# Patient Record
Sex: FEMALE | Race: WHITE | NOT HISPANIC OR LATINO | Employment: FULL TIME | URBAN - METROPOLITAN AREA
[De-identification: names, ages, dates, MRNs, and addresses within clinical notes are randomized per-mention and may not be internally consistent; named-entity substitution may affect disease eponyms.]

---

## 2017-01-13 ENCOUNTER — OFFICE VISIT - HEALTHEAST (OUTPATIENT)
Dept: FAMILY MEDICINE | Facility: CLINIC | Age: 27
End: 2017-01-13

## 2017-01-13 DIAGNOSIS — R10.2 PELVIC PAIN: ICD-10-CM

## 2017-01-13 DIAGNOSIS — N91.2 AMENORRHEA: ICD-10-CM

## 2017-01-13 ASSESSMENT — MIFFLIN-ST. JEOR: SCORE: 1332.35

## 2018-02-07 ENCOUNTER — OFFICE VISIT - HEALTHEAST (OUTPATIENT)
Dept: FAMILY MEDICINE | Facility: CLINIC | Age: 28
End: 2018-02-07

## 2018-02-07 DIAGNOSIS — N91.2 AMENORRHEA: ICD-10-CM

## 2018-02-07 DIAGNOSIS — R11.10 VOMITING: ICD-10-CM

## 2018-02-07 DIAGNOSIS — N92.1 METRORRHAGIA: ICD-10-CM

## 2018-02-07 DIAGNOSIS — R10.30 LOWER ABDOMINAL PAIN: ICD-10-CM

## 2018-02-07 LAB
ALBUMIN UR-MCNC: NEGATIVE MG/DL
APPEARANCE UR: CLEAR
BILIRUB UR QL STRIP: NEGATIVE
COLOR UR AUTO: YELLOW
GLUCOSE UR STRIP-MCNC: NEGATIVE MG/DL
HCG UR QL: POSITIVE
HGB UR QL STRIP: ABNORMAL
KETONES UR STRIP-MCNC: NEGATIVE MG/DL
LEUKOCYTE ESTERASE UR QL STRIP: ABNORMAL
NITRATE UR QL: NEGATIVE
PH UR STRIP: 6.5 [PH] (ref 5–8)
SP GR UR STRIP: 1.02 (ref 1–1.03)
UROBILINOGEN UR STRIP-ACNC: ABNORMAL

## 2018-02-08 LAB — BACTERIA SPEC CULT: NO GROWTH

## 2018-02-23 ENCOUNTER — OFFICE VISIT - HEALTHEAST (OUTPATIENT)
Dept: FAMILY MEDICINE | Facility: CLINIC | Age: 28
End: 2018-02-23

## 2018-02-23 DIAGNOSIS — Z34.81 ENCOUNTER FOR SUPERVISION OF OTHER NORMAL PREGNANCY IN FIRST TRIMESTER: ICD-10-CM

## 2018-02-23 LAB
ALBUMIN UR-MCNC: NEGATIVE MG/DL
APPEARANCE UR: CLEAR
BASOPHILS # BLD AUTO: 0 THOU/UL (ref 0–0.2)
BASOPHILS NFR BLD AUTO: 0 % (ref 0–2)
BILIRUB UR QL STRIP: NEGATIVE
COLOR UR AUTO: YELLOW
EOSINOPHIL # BLD AUTO: 0 THOU/UL (ref 0–0.4)
EOSINOPHIL NFR BLD AUTO: 0 % (ref 0–6)
ERYTHROCYTE [DISTWIDTH] IN BLOOD BY AUTOMATED COUNT: 12.6 % (ref 11–14.5)
GLUCOSE UR STRIP-MCNC: NEGATIVE MG/DL
HCT VFR BLD AUTO: 40.3 % (ref 35–47)
HGB BLD-MCNC: 13.4 G/DL (ref 12–16)
HGB UR QL STRIP: NEGATIVE
HIV 1+2 AB+HIV1 P24 AG SERPL QL IA: NEGATIVE
KETONES UR STRIP-MCNC: NEGATIVE MG/DL
LEUKOCYTE ESTERASE UR QL STRIP: NEGATIVE
LYMPHOCYTES # BLD AUTO: 2.3 THOU/UL (ref 0.8–4.4)
LYMPHOCYTES NFR BLD AUTO: 25 % (ref 20–40)
MCH RBC QN AUTO: 29.8 PG (ref 27–34)
MCHC RBC AUTO-ENTMCNC: 33.3 G/DL (ref 32–36)
MCV RBC AUTO: 90 FL (ref 80–100)
MONOCYTES # BLD AUTO: 0.7 THOU/UL (ref 0–0.9)
MONOCYTES NFR BLD AUTO: 8 % (ref 2–10)
NEUTROPHILS # BLD AUTO: 6 THOU/UL (ref 2–7.7)
NEUTROPHILS NFR BLD AUTO: 67 % (ref 50–70)
NITRATE UR QL: NEGATIVE
PH UR STRIP: 7 [PH] (ref 5–8)
PLATELET # BLD AUTO: 212 THOU/UL (ref 140–440)
PMV BLD AUTO: 12.2 FL (ref 8.5–12.5)
RBC # BLD AUTO: 4.49 MILL/UL (ref 3.8–5.4)
SP GR UR STRIP: 1.01 (ref 1–1.03)
UROBILINOGEN UR STRIP-ACNC: NORMAL
WBC: 9 THOU/UL (ref 4–11)

## 2018-02-23 ASSESSMENT — MIFFLIN-ST. JEOR: SCORE: 1206.7

## 2018-02-24 LAB
ANTIBODY SCREEN: NEGATIVE
BACTERIA SPEC CULT: NO GROWTH
HBV SURFACE AG SERPL QL IA: NEGATIVE

## 2018-02-25 LAB
RUBV IGG SERPL QL IA: NORMAL
SYPHILIS RPR SCREEN - HISTORICAL: NORMAL

## 2018-02-26 LAB
ABO/RH(D): NORMAL
ABORH REPEAT: NORMAL

## 2018-03-05 ENCOUNTER — COMMUNICATION - HEALTHEAST (OUTPATIENT)
Dept: FAMILY MEDICINE | Facility: CLINIC | Age: 28
End: 2018-03-05

## 2018-03-06 ENCOUNTER — HOSPITAL ENCOUNTER (OUTPATIENT)
Dept: ULTRASOUND IMAGING | Facility: HOSPITAL | Age: 28
Discharge: HOME | End: 2018-03-06
Attending: FAMILY MEDICINE

## 2018-03-06 DIAGNOSIS — Z34.81 ENCOUNTER FOR SUPERVISION OF OTHER NORMAL PREGNANCY IN FIRST TRIMESTER: ICD-10-CM

## 2018-03-08 ENCOUNTER — COMMUNICATION - HEALTHEAST (OUTPATIENT)
Dept: TELEHEALTH | Facility: CLINIC | Age: 28
End: 2018-03-08

## 2018-03-08 ENCOUNTER — PRENATAL OFFICE VISIT - HEALTHEAST (OUTPATIENT)
Dept: FAMILY MEDICINE | Facility: CLINIC | Age: 28
End: 2018-03-08

## 2018-03-08 DIAGNOSIS — Z34.81 ENCOUNTER FOR SUPERVISION OF OTHER NORMAL PREGNANCY IN FIRST TRIMESTER: ICD-10-CM

## 2018-03-09 LAB
C TRACH DNA SPEC QL PROBE+SIG AMP: NEGATIVE
N GONORRHOEA DNA SPEC QL NAA+PROBE: NEGATIVE

## 2018-04-06 ENCOUNTER — COMMUNICATION - HEALTHEAST (OUTPATIENT)
Dept: TELEHEALTH | Facility: CLINIC | Age: 28
End: 2018-04-06

## 2018-04-06 ENCOUNTER — PRENATAL OFFICE VISIT - HEALTHEAST (OUTPATIENT)
Dept: FAMILY MEDICINE | Facility: CLINIC | Age: 28
End: 2018-04-06

## 2018-04-06 DIAGNOSIS — Z34.81 ENCOUNTER FOR SUPERVISION OF OTHER NORMAL PREGNANCY IN FIRST TRIMESTER: ICD-10-CM

## 2018-04-23 ENCOUNTER — COMMUNICATION - HEALTHEAST (OUTPATIENT)
Dept: FAMILY MEDICINE | Facility: CLINIC | Age: 28
End: 2018-04-23

## 2018-05-04 ENCOUNTER — PRENATAL OFFICE VISIT - HEALTHEAST (OUTPATIENT)
Dept: FAMILY MEDICINE | Facility: CLINIC | Age: 28
End: 2018-05-04

## 2018-05-04 DIAGNOSIS — Z34.82 ENCOUNTER FOR SUPERVISION OF OTHER NORMAL PREGNANCY IN SECOND TRIMESTER: ICD-10-CM

## 2018-05-11 ENCOUNTER — HOSPITAL ENCOUNTER (OUTPATIENT)
Dept: ULTRASOUND IMAGING | Facility: HOSPITAL | Age: 28
Discharge: HOME | End: 2018-05-11
Attending: FAMILY MEDICINE

## 2018-06-08 ENCOUNTER — PRENATAL OFFICE VISIT - HEALTHEAST (OUTPATIENT)
Dept: FAMILY MEDICINE | Facility: CLINIC | Age: 28
End: 2018-06-08

## 2018-06-08 DIAGNOSIS — Z34.82 ENCOUNTER FOR SUPERVISION OF OTHER NORMAL PREGNANCY IN SECOND TRIMESTER: ICD-10-CM

## 2018-06-28 ENCOUNTER — PRENATAL OFFICE VISIT - HEALTHEAST (OUTPATIENT)
Dept: FAMILY MEDICINE | Facility: CLINIC | Age: 28
End: 2018-06-28

## 2018-06-28 DIAGNOSIS — Z34.02 ENCOUNTER FOR SUPERVISION OF NORMAL FIRST PREGNANCY IN SECOND TRIMESTER: ICD-10-CM

## 2018-07-17 ENCOUNTER — COMMUNICATION - HEALTHEAST (OUTPATIENT)
Dept: FAMILY MEDICINE | Facility: CLINIC | Age: 28
End: 2018-07-17

## 2018-07-18 ENCOUNTER — COMMUNICATION - HEALTHEAST (OUTPATIENT)
Dept: FAMILY MEDICINE | Facility: CLINIC | Age: 28
End: 2018-07-18

## 2018-07-20 ENCOUNTER — PRENATAL OFFICE VISIT - HEALTHEAST (OUTPATIENT)
Dept: FAMILY MEDICINE | Facility: CLINIC | Age: 28
End: 2018-07-20

## 2018-07-20 DIAGNOSIS — Z34.82 ENCOUNTER FOR SUPERVISION OF OTHER NORMAL PREGNANCY IN SECOND TRIMESTER: ICD-10-CM

## 2018-07-20 LAB
ERYTHROCYTE [DISTWIDTH] IN BLOOD BY AUTOMATED COUNT: 12.1 % (ref 11–14.5)
FASTING STATUS PATIENT QL REPORTED: NO
GLUCOSE 1H P 50 G GLC PO SERPL-MCNC: 98 MG/DL (ref 70–139)
HCT VFR BLD AUTO: 35.2 % (ref 35–47)
HGB BLD-MCNC: 12 G/DL (ref 12–16)
MCH RBC QN AUTO: 31.5 PG (ref 27–34)
MCHC RBC AUTO-ENTMCNC: 34.2 G/DL (ref 32–36)
MCV RBC AUTO: 92 FL (ref 80–100)
PLATELET # BLD AUTO: 162 THOU/UL (ref 140–440)
PMV BLD AUTO: 8.8 FL (ref 7–10)
RBC # BLD AUTO: 3.81 MILL/UL (ref 3.8–5.4)
WBC: 8.4 THOU/UL (ref 4–11)

## 2018-07-21 LAB — T PALLIDUM AB SER QL: NEGATIVE

## 2018-07-23 LAB
BKR LAB AP ABNORMAL BLEEDING: NO
BKR LAB AP BIRTH CONTROL/HORMONES: NORMAL
BKR LAB AP CERVICAL APPEARANCE: NORMAL
BKR LAB AP GYN ADEQUACY: NORMAL
BKR LAB AP GYN INTERPRETATION: NORMAL
BKR LAB AP HPV REFLEX: NORMAL
BKR LAB AP LMP: NORMAL
BKR LAB AP PATIENT STATUS: NORMAL
BKR LAB AP PREVIOUS ABNORMAL: NORMAL
BKR LAB AP PREVIOUS NORMAL: 2011
HIGH RISK?: NO
PATH REPORT.COMMENTS IMP SPEC: NORMAL
RESULT FLAG (HE HISTORICAL CONVERSION): NORMAL

## 2018-08-02 ENCOUNTER — PRENATAL OFFICE VISIT - HEALTHEAST (OUTPATIENT)
Dept: FAMILY MEDICINE | Facility: CLINIC | Age: 28
End: 2018-08-02

## 2018-08-02 DIAGNOSIS — Z34.83 ENCOUNTER FOR SUPERVISION OF OTHER NORMAL PREGNANCY IN THIRD TRIMESTER: ICD-10-CM

## 2018-08-17 ENCOUNTER — PRENATAL OFFICE VISIT - HEALTHEAST (OUTPATIENT)
Dept: FAMILY MEDICINE | Facility: CLINIC | Age: 28
End: 2018-08-17

## 2018-08-17 DIAGNOSIS — Z34.83 ENCOUNTER FOR SUPERVISION OF OTHER NORMAL PREGNANCY IN THIRD TRIMESTER: ICD-10-CM

## 2018-08-23 ENCOUNTER — HOSPITAL ENCOUNTER (OUTPATIENT)
Dept: OBGYN | Facility: HOSPITAL | Age: 28
Discharge: HOME | End: 2018-08-23
Attending: FAMILY MEDICINE | Admitting: FAMILY MEDICINE

## 2018-08-24 ENCOUNTER — COMMUNICATION - HEALTHEAST (OUTPATIENT)
Dept: FAMILY MEDICINE | Facility: CLINIC | Age: 28
End: 2018-08-24

## 2018-08-31 ENCOUNTER — PRENATAL OFFICE VISIT - HEALTHEAST (OUTPATIENT)
Dept: FAMILY MEDICINE | Facility: CLINIC | Age: 28
End: 2018-08-31

## 2018-08-31 DIAGNOSIS — Z34.83 ENCOUNTER FOR SUPERVISION OF OTHER NORMAL PREGNANCY IN THIRD TRIMESTER: ICD-10-CM

## 2018-09-05 ENCOUNTER — COMMUNICATION - HEALTHEAST (OUTPATIENT)
Dept: FAMILY MEDICINE | Facility: CLINIC | Age: 28
End: 2018-09-05

## 2018-09-06 ENCOUNTER — HOSPITAL ENCOUNTER (OUTPATIENT)
Dept: ULTRASOUND IMAGING | Facility: HOSPITAL | Age: 28
Discharge: HOME | End: 2018-09-06
Attending: FAMILY MEDICINE

## 2018-09-13 ENCOUNTER — PRENATAL OFFICE VISIT - HEALTHEAST (OUTPATIENT)
Dept: FAMILY MEDICINE | Facility: CLINIC | Age: 28
End: 2018-09-13

## 2018-09-13 DIAGNOSIS — Z34.83 ENCOUNTER FOR SUPERVISION OF OTHER NORMAL PREGNANCY IN THIRD TRIMESTER: ICD-10-CM

## 2018-09-14 LAB
ALLERGIC TO PENICILLIN: NO
GP B STREP DNA SPEC QL NAA+PROBE: NEGATIVE

## 2018-09-21 ENCOUNTER — PRENATAL OFFICE VISIT - HEALTHEAST (OUTPATIENT)
Dept: FAMILY MEDICINE | Facility: CLINIC | Age: 28
End: 2018-09-21

## 2018-09-21 DIAGNOSIS — Z34.03 ENCOUNTER FOR SUPERVISION OF NORMAL FIRST PREGNANCY IN THIRD TRIMESTER: ICD-10-CM

## 2018-09-28 ENCOUNTER — PRENATAL OFFICE VISIT - HEALTHEAST (OUTPATIENT)
Dept: FAMILY MEDICINE | Facility: CLINIC | Age: 28
End: 2018-09-28

## 2018-09-28 DIAGNOSIS — Z34.83 ENCOUNTER FOR SUPERVISION OF OTHER NORMAL PREGNANCY IN THIRD TRIMESTER: ICD-10-CM

## 2018-10-02 ENCOUNTER — COMMUNICATION - HEALTHEAST (OUTPATIENT)
Dept: FAMILY MEDICINE | Facility: CLINIC | Age: 28
End: 2018-10-02

## 2018-10-03 ENCOUNTER — COMMUNICATION - HEALTHEAST (OUTPATIENT)
Dept: OBGYN | Facility: CLINIC | Age: 28
End: 2018-10-03

## 2018-10-03 ENCOUNTER — HOSPITAL ENCOUNTER (OUTPATIENT)
Dept: ULTRASOUND IMAGING | Facility: HOSPITAL | Age: 28
Discharge: HOME | End: 2018-10-03
Attending: FAMILY MEDICINE

## 2018-10-03 ENCOUNTER — OFFICE VISIT - HEALTHEAST (OUTPATIENT)
Dept: FAMILY MEDICINE | Facility: CLINIC | Age: 28
End: 2018-10-03

## 2018-10-03 DIAGNOSIS — R60.0 EDEMA LEG: ICD-10-CM

## 2018-10-03 LAB
ALBUMIN SERPL-MCNC: 2.7 G/DL (ref 3.5–5)
ALBUMIN UR-MCNC: NEGATIVE MG/DL
ALP SERPL-CCNC: 215 U/L (ref 45–120)
ALT SERPL W P-5'-P-CCNC: 10 U/L (ref 0–45)
ANION GAP SERPL CALCULATED.3IONS-SCNC: 10 MMOL/L (ref 5–18)
APPEARANCE UR: CLEAR
AST SERPL W P-5'-P-CCNC: 18 U/L (ref 0–40)
BILIRUB SERPL-MCNC: 0.2 MG/DL (ref 0–1)
BILIRUB UR QL STRIP: NEGATIVE
BUN SERPL-MCNC: 10 MG/DL (ref 8–22)
CALCIUM SERPL-MCNC: 8.7 MG/DL (ref 8.5–10.5)
CHLORIDE BLD-SCNC: 109 MMOL/L (ref 98–107)
CO2 SERPL-SCNC: 20 MMOL/L (ref 22–31)
COLOR UR AUTO: YELLOW
CREAT SERPL-MCNC: 0.64 MG/DL (ref 0.6–1.1)
ERYTHROCYTE [DISTWIDTH] IN BLOOD BY AUTOMATED COUNT: 12.5 % (ref 11–14.5)
GFR SERPL CREATININE-BSD FRML MDRD: >60 ML/MIN/1.73M2
GLUCOSE BLD-MCNC: 74 MG/DL (ref 70–125)
GLUCOSE UR STRIP-MCNC: NEGATIVE MG/DL
HCT VFR BLD AUTO: 37.7 % (ref 35–47)
HGB BLD-MCNC: 12.9 G/DL (ref 12–16)
HGB UR QL STRIP: NEGATIVE
KETONES UR STRIP-MCNC: NEGATIVE MG/DL
LEUKOCYTE ESTERASE UR QL STRIP: NEGATIVE
MCH RBC QN AUTO: 30.7 PG (ref 27–34)
MCHC RBC AUTO-ENTMCNC: 34.2 G/DL (ref 32–36)
MCV RBC AUTO: 90 FL (ref 80–100)
NITRATE UR QL: NEGATIVE
PH UR STRIP: 7.5 [PH] (ref 5–8)
PLATELET # BLD AUTO: 115 THOU/UL (ref 140–440)
PMV BLD AUTO: 10.2 FL (ref 7–10)
POTASSIUM BLD-SCNC: 4.3 MMOL/L (ref 3.5–5)
PROT SERPL-MCNC: 5.6 G/DL (ref 6–8)
RBC # BLD AUTO: 4.19 MILL/UL (ref 3.8–5.4)
SODIUM SERPL-SCNC: 139 MMOL/L (ref 136–145)
SP GR UR STRIP: 1.01 (ref 1–1.03)
UROBILINOGEN UR STRIP-ACNC: NORMAL
WBC: 7.6 THOU/UL (ref 4–11)

## 2018-10-03 ASSESSMENT — MIFFLIN-ST. JEOR: SCORE: 1364.09

## 2018-10-05 ENCOUNTER — PRENATAL OFFICE VISIT - HEALTHEAST (OUTPATIENT)
Dept: FAMILY MEDICINE | Facility: CLINIC | Age: 28
End: 2018-10-05

## 2018-10-05 DIAGNOSIS — O36.8130 DECREASED FETAL MOVEMENTS IN THIRD TRIMESTER, SINGLE OR UNSPECIFIED FETUS: ICD-10-CM

## 2018-10-05 DIAGNOSIS — Z34.03 ENCOUNTER FOR SUPERVISION OF NORMAL FIRST PREGNANCY IN THIRD TRIMESTER: ICD-10-CM

## 2018-10-06 ENCOUNTER — ANESTHESIA - HEALTHEAST (OUTPATIENT)
Dept: OBGYN | Facility: CLINIC | Age: 28
End: 2018-10-06

## 2018-10-07 ENCOUNTER — HOME CARE/HOSPICE - HEALTHEAST (OUTPATIENT)
Dept: HOME HEALTH SERVICES | Facility: HOME HEALTH | Age: 28
End: 2018-10-07

## 2018-10-09 ENCOUNTER — HOME CARE/HOSPICE - HEALTHEAST (OUTPATIENT)
Dept: HOME HEALTH SERVICES | Facility: HOME HEALTH | Age: 28
End: 2018-10-09

## 2018-10-09 ENCOUNTER — COMMUNICATION - HEALTHEAST (OUTPATIENT)
Dept: SCHEDULING | Facility: CLINIC | Age: 28
End: 2018-10-09

## 2018-10-16 ENCOUNTER — OFFICE VISIT - HEALTHEAST (OUTPATIENT)
Dept: FAMILY MEDICINE | Facility: CLINIC | Age: 28
End: 2018-10-16

## 2018-10-29 ENCOUNTER — COMMUNICATION - HEALTHEAST (OUTPATIENT)
Dept: FAMILY MEDICINE | Facility: CLINIC | Age: 28
End: 2018-10-29

## 2018-11-04 ENCOUNTER — COMMUNICATION - HEALTHEAST (OUTPATIENT)
Dept: OBGYN | Facility: CLINIC | Age: 28
End: 2018-11-04

## 2018-11-16 ENCOUNTER — OFFICE VISIT - HEALTHEAST (OUTPATIENT)
Dept: FAMILY MEDICINE | Facility: CLINIC | Age: 28
End: 2018-11-16

## 2018-11-16 ASSESSMENT — MIFFLIN-ST. JEOR: SCORE: 1267.93

## 2018-11-19 ENCOUNTER — OFFICE VISIT - HEALTHEAST (OUTPATIENT)
Dept: FAMILY MEDICINE | Facility: CLINIC | Age: 28
End: 2018-11-19

## 2018-11-19 DIAGNOSIS — Z30.430 ENCOUNTER FOR INSERTION OF INTRAUTERINE CONTRACEPTIVE DEVICE (IUD): ICD-10-CM

## 2018-12-03 ENCOUNTER — COMMUNICATION - HEALTHEAST (OUTPATIENT)
Dept: OBGYN | Facility: CLINIC | Age: 28
End: 2018-12-03

## 2019-01-01 ENCOUNTER — COMMUNICATION - HEALTHEAST (OUTPATIENT)
Dept: OBGYN | Facility: CLINIC | Age: 29
End: 2019-01-01

## 2019-01-24 ENCOUNTER — COMMUNICATION - HEALTHEAST (OUTPATIENT)
Dept: SCHEDULING | Facility: CLINIC | Age: 29
End: 2019-01-24

## 2019-01-28 ENCOUNTER — OFFICE VISIT - HEALTHEAST (OUTPATIENT)
Dept: FAMILY MEDICINE | Facility: CLINIC | Age: 29
End: 2019-01-28

## 2019-01-28 DIAGNOSIS — S39.012A BACK STRAIN, INITIAL ENCOUNTER: ICD-10-CM

## 2019-01-28 DIAGNOSIS — B08.1 MOLLUSCUM CONTAGIOSUM: ICD-10-CM

## 2019-01-28 ASSESSMENT — MIFFLIN-ST. JEOR: SCORE: 1213.5

## 2019-02-21 ENCOUNTER — COMMUNICATION - HEALTHEAST (OUTPATIENT)
Dept: FAMILY MEDICINE | Facility: CLINIC | Age: 29
End: 2019-02-21

## 2019-03-08 ENCOUNTER — COMMUNICATION - HEALTHEAST (OUTPATIENT)
Dept: FAMILY MEDICINE | Facility: CLINIC | Age: 29
End: 2019-03-08

## 2019-05-21 ENCOUNTER — OFFICE VISIT - HEALTHEAST (OUTPATIENT)
Dept: FAMILY MEDICINE | Facility: CLINIC | Age: 29
End: 2019-05-21

## 2019-05-21 DIAGNOSIS — R05.9 COUGH: ICD-10-CM

## 2019-05-21 DIAGNOSIS — R53.83 FATIGUE, UNSPECIFIED TYPE: ICD-10-CM

## 2019-06-14 ENCOUNTER — OFFICE VISIT - HEALTHEAST (OUTPATIENT)
Dept: FAMILY MEDICINE | Facility: CLINIC | Age: 29
End: 2019-06-14

## 2019-06-14 DIAGNOSIS — F32.0 CURRENT MILD EPISODE OF MAJOR DEPRESSIVE DISORDER WITHOUT PRIOR EPISODE (H): ICD-10-CM

## 2019-06-14 DIAGNOSIS — R06.81 WITNESSED APNEIC SPELLS: ICD-10-CM

## 2019-06-14 DIAGNOSIS — R06.83 SNORING: ICD-10-CM

## 2019-06-14 ASSESSMENT — MIFFLIN-ST. JEOR: SCORE: 1177.21

## 2019-07-12 ENCOUNTER — OFFICE VISIT - HEALTHEAST (OUTPATIENT)
Dept: FAMILY MEDICINE | Facility: CLINIC | Age: 29
End: 2019-07-12

## 2019-07-12 ENCOUNTER — RECORDS - HEALTHEAST (OUTPATIENT)
Dept: ADMINISTRATIVE | Facility: OTHER | Age: 29
End: 2019-07-12

## 2019-07-12 DIAGNOSIS — Z30.017 ENCOUNTER FOR INITIAL PRESCRIPTION OF IMPLANTABLE SUBDERMAL CONTRACEPTIVE: ICD-10-CM

## 2019-07-12 DIAGNOSIS — T83.32XA INTRAUTERINE CONTRACEPTIVE DEVICE THREADS LOST, INITIAL ENCOUNTER: ICD-10-CM

## 2019-07-12 LAB — HCG UR QL: NEGATIVE

## 2019-07-12 ASSESSMENT — MIFFLIN-ST. JEOR: SCORE: 1159.06

## 2019-07-16 ENCOUNTER — OFFICE VISIT - HEALTHEAST (OUTPATIENT)
Dept: OBGYN | Facility: CLINIC | Age: 29
End: 2019-07-16

## 2019-07-16 DIAGNOSIS — Z30.432 ENCOUNTER FOR IUD REMOVAL: ICD-10-CM

## 2019-07-16 ASSESSMENT — MIFFLIN-ST. JEOR: SCORE: 1172.67

## 2019-07-22 ENCOUNTER — COMMUNICATION - HEALTHEAST (OUTPATIENT)
Dept: FAMILY MEDICINE | Facility: CLINIC | Age: 29
End: 2019-07-22

## 2019-07-22 ENCOUNTER — HOSPITAL ENCOUNTER (OUTPATIENT)
Dept: ULTRASOUND IMAGING | Facility: HOSPITAL | Age: 29
Discharge: HOME | End: 2019-07-22
Attending: FAMILY MEDICINE

## 2019-07-22 ENCOUNTER — OFFICE VISIT - HEALTHEAST (OUTPATIENT)
Dept: FAMILY MEDICINE | Facility: CLINIC | Age: 29
End: 2019-07-22

## 2019-07-22 DIAGNOSIS — M25.551 PELVIC JOINT PAIN, RIGHT: ICD-10-CM

## 2019-07-22 DIAGNOSIS — R10.31 RLQ ABDOMINAL PAIN: ICD-10-CM

## 2019-07-22 DIAGNOSIS — B37.31 YEAST INFECTION OF THE VAGINA: ICD-10-CM

## 2019-07-22 LAB
CLUE CELLS: ABNORMAL
TRICHOMONAS, WET PREP: ABNORMAL
YEAST, WET PREP: ABNORMAL

## 2019-09-17 ENCOUNTER — COMMUNICATION - HEALTHEAST (OUTPATIENT)
Dept: FAMILY MEDICINE | Facility: CLINIC | Age: 29
End: 2019-09-17

## 2019-09-18 ENCOUNTER — COMMUNICATION - HEALTHEAST (OUTPATIENT)
Dept: FAMILY MEDICINE | Facility: CLINIC | Age: 29
End: 2019-09-18

## 2019-12-06 ENCOUNTER — OFFICE VISIT - HEALTHEAST (OUTPATIENT)
Dept: FAMILY MEDICINE | Facility: CLINIC | Age: 29
End: 2019-12-06

## 2019-12-06 DIAGNOSIS — Z30.46 ENCOUNTER FOR SURVEILLANCE OF IMPLANTABLE SUBDERMAL CONTRACEPTIVE: ICD-10-CM

## 2019-12-06 DIAGNOSIS — Z30.015 ENCOUNTER FOR INITIAL PRESCRIPTION OF VAGINAL RING HORMONAL CONTRACEPTIVE: ICD-10-CM

## 2019-12-06 ASSESSMENT — MIFFLIN-ST. JEOR: SCORE: 1195.35

## 2020-02-07 ENCOUNTER — COMMUNICATION - HEALTHEAST (OUTPATIENT)
Dept: FAMILY MEDICINE | Facility: CLINIC | Age: 30
End: 2020-02-07

## 2020-02-13 ENCOUNTER — COMMUNICATION - HEALTHEAST (OUTPATIENT)
Dept: FAMILY MEDICINE | Facility: CLINIC | Age: 30
End: 2020-02-13

## 2020-02-14 ENCOUNTER — OFFICE VISIT - HEALTHEAST (OUTPATIENT)
Dept: FAMILY MEDICINE | Facility: CLINIC | Age: 30
End: 2020-02-14

## 2020-02-14 DIAGNOSIS — Z30.430 ENCOUNTER FOR IUD INSERTION: ICD-10-CM

## 2020-02-14 DIAGNOSIS — Z01.818 PRE-PROCEDURAL EXAMINATION: ICD-10-CM

## 2020-02-14 LAB — HCG UR QL: NEGATIVE

## 2020-02-14 ASSESSMENT — PATIENT HEALTH QUESTIONNAIRE - PHQ9: SUM OF ALL RESPONSES TO PHQ QUESTIONS 1-9: 4

## 2020-02-17 LAB
C TRACH DNA SPEC QL PROBE+SIG AMP: NEGATIVE
N GONORRHOEA DNA SPEC QL NAA+PROBE: NEGATIVE

## 2020-04-08 ENCOUNTER — COMMUNICATION - HEALTHEAST (OUTPATIENT)
Dept: SCHEDULING | Facility: CLINIC | Age: 30
End: 2020-04-08

## 2020-10-08 ENCOUNTER — COMMUNICATION - HEALTHEAST (OUTPATIENT)
Dept: SCHEDULING | Facility: CLINIC | Age: 30
End: 2020-10-08

## 2020-10-08 DIAGNOSIS — S05.00XD CORNEAL ABRASION, UNSPECIFIED LATERALITY, SUBSEQUENT ENCOUNTER: ICD-10-CM

## 2020-10-09 ENCOUNTER — COMMUNICATION - HEALTHEAST (OUTPATIENT)
Dept: SCHEDULING | Facility: CLINIC | Age: 30
End: 2020-10-09

## 2020-10-09 DIAGNOSIS — S05.00XD CORNEAL ABRASION, UNSPECIFIED LATERALITY, SUBSEQUENT ENCOUNTER: ICD-10-CM

## 2020-10-09 RX ORDER — CODEINE SULFATE 30 MG/1
30 TABLET ORAL EVERY 6 HOURS PRN
Qty: 10 TABLET | Refills: 0 | Status: SHIPPED | OUTPATIENT
Start: 2020-10-09 | End: 2022-03-04

## 2021-05-27 ASSESSMENT — PATIENT HEALTH QUESTIONNAIRE - PHQ9: SUM OF ALL RESPONSES TO PHQ QUESTIONS 1-9: 4

## 2021-05-29 NOTE — PROGRESS NOTES
Assessment/Plan:    1. Cough  Cough noted.  No evidence for recurrent bacterial sinusitis, pneumonia etc.  Guaifenesin with codeine prescribed as directed for cough suppression.  Notify persistent concerns or if worsening.  - codeine-guaiFENesin (GUAIFENESIN AC)  mg/5 mL liquid; Take 5-10 mL by mouth 3 (three) times a day as needed for cough.  Dispense: 240 mL; Refill: 0    2.  Fatigue  Likely multifactorial.  8-month-old daughter at home.  Patient declines labs including TSH to rule out postpartum thyroiditis, anemia etc.  Patient elects to monitor with changing season and hopefully more sun exposure in case of vitamin D issues.          Subjective:    Charlotte Clarke is seen today for concerns with cough.  Nonproductive.  Ongoing since last Friday.  Nasal congestion.  Postnasal drainage, scant.  No nausea vomiting.  No fevers or chills.  Also fatigue issues ongoing over many months.  Gets 6 to 8 hours of sleep.  Daughter was born in October currently 8 months of age.  Moved into a new home recently.  Doing the yard work etc.  Busy in general.  No history of significant snoring or history of sleep apnea.  Denies history of significant anemia etc.  No history of vitamin D deficiency described.  Comprehensive review of systems as above otherwise all negative.    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 No pertinent surgical history.     No family history on file.     History reviewed. No pertinent past medical history.     Social History     Tobacco Use     Smoking status: Former Smoker     Last attempt to quit: 2018     Years since quittin.2     Smokeless tobacco: Never Used   Substance Use Topics     Alcohol use: No     Drug use: No         Current Outpatient Medications   Medication Sig Dispense Refill     acetaminophen (TYLENOL) 325 MG tablet Take 1-2 tablets (325-650 mg total) by mouth every 4 (four) hours as needed.  0     cyclobenzaprine (FLEXERIL) 10 MG tablet Take 0.5 tablets (5 mg total) by mouth every 8 (eight) hours as needed for muscle spasms. 30 tablet 1     hydrOXYzine HCl (ATARAX) 25 MG tablet Take 1 tablet (25 mg total) by mouth 3 (three) times a day as needed for anxiety. 90 tablet 0     ibuprofen (ADVIL,MOTRIN) 800 MG tablet TAKE 1 TABLET BY MOUTH EVERY 8 HOURS 90 tablet 0     PRENATAL VIT37/IRON/FOLIC ACID (PRENATA ORAL) Take by mouth.       codeine-guaiFENesin (GUAIFENESIN AC)  mg/5 mL liquid Take 5-10 mL by mouth 3 (three) times a day as needed for cough. 240 mL 0     No current facility-administered medications for this visit.           Objective:    Vitals:    05/21/19 1413   BP: 100/60   Pulse: 80   Temp: 97.9  F (36.6  C)   SpO2: 99%   Weight: 116 lb (52.6 kg)      Body mass index is 22.65 kg/m .    Alert.  No apparent distress.  Nontoxic.  HEENT exam appears relatively normal with mild nasal congestion only.  Oropharynx with moist mucous membranes without postnasal drainage.  Neck supple.  Chest clear currently without expiratory wheeze or inspiratory crackles.  Cardiac exam regular.  Extremities warm and dry with good skin turgor.  Brisk capillary refill.      This note has been dictated using voice recognition software and as a result may contain minor grammatical errors and unintended word substitutions.

## 2021-05-29 NOTE — PROGRESS NOTES
Assessment/Plan:     Patient presents to clinic for irritability and depression symptoms.  She and I reviewed the diagnostic criteria for bipolar disorder, as her  made a comment about her emotional lability.  I do not feel, at this time, that patient qualifies for a clinical diagnosis of bipolar type I or type II.  We will treat her symptoms as a depression with irritability manifestations.  In addition, her witnessed sleep apnea and snoring bear further scrutiny.    We chose to do the following after extensive shared decision making:  -Initiation on Prozac, low-dose  -Initiation of hydroxyzine 12.5mg to 25 mg up to 3 times daily as needed or to help with sleep  -Melatonin for middle of the night awakening  -Consider individual or couples counseling  -Referral to sleep medicine    Problem List Items Addressed This Visit     None      Visit Diagnoses     Snoring    -  Primary    Relevant Orders    Ambulatory referral to Sleep Medicine    Witnessed apneic spells        Relevant Orders    Ambulatory referral to Sleep Medicine    Current mild episode of major depressive disorder without prior episode (H)        Relevant Medications    FLUoxetine (PROZAC) 10 MG capsule          Return to clinic in 6 weeks.    Total time spent with patient was 20 minutes with greater than 50% spent in face-to-face counseling regarding the above plan.    Subjective:      Charlotte Clarke is a 29 y.o. female who presents to clinic for depression follow-up.    Patient states that she feels angry all of the time.  She will awaken at 3 AM every night gasping for air and will just have her mind racing with thoughts of frustration with her partner.  She feels as though her thoughts are racing sometimes.  She also endorses significant irritability with anything her  does.  Her partner feels as though she is depressed or bipolar.  She feels that she has anxiety.  Patient is endorsing poor sleep, secondary to this 3 AM awakening.   She also endorses snoring.    Patient has never been to counseling before but thinks that couples counseling may very well benefit her relationship.  Her partner is not interested at this time.    She has no thought of hurting herself or others, but expanses significant irritability and other depression symptoms.    Phq9: 8  Gad7: 12    Past Medical History, Family History, and Social History reviewed.     Current Outpatient Medications on File Prior to Visit   Medication Sig Dispense Refill     [DISCONTINUED] acetaminophen (TYLENOL) 325 MG tablet Take 1-2 tablets (325-650 mg total) by mouth every 4 (four) hours as needed.  0     [DISCONTINUED] ibuprofen (ADVIL,MOTRIN) 800 MG tablet TAKE 1 TABLET BY MOUTH EVERY 8 HOURS 90 tablet 0     hydrOXYzine HCl (ATARAX) 25 MG tablet Take 1 tablet (25 mg total) by mouth 3 (three) times a day as needed for anxiety. 90 tablet 0     [DISCONTINUED] cyclobenzaprine (FLEXERIL) 10 MG tablet Take 0.5 tablets (5 mg total) by mouth every 8 (eight) hours as needed for muscle spasms. 30 tablet 1     [DISCONTINUED] PRENATAL VIT37/IRON/FOLIC ACID (PRENATA ORAL) Take by mouth.       No current facility-administered medications on file prior to visit.        Review of systems is as stated in HPI.  The remainder of the 10 system review is otherwise negative.    Objective:     /70   Pulse 85   Ht 5' (1.524 m)   Wt 117 lb (53.1 kg)   SpO2 99%   BMI 22.85 kg/m   Body mass index is 22.85 kg/m .    Gen: Alert, NAD, appears stated age, normal hygiene   Psych: no apparent hallucinations or delusions, no pressured speech; alert, oriented x3    This note has been dictated using voice recognition software. Any grammatical or context distortions are unintentional and inherent to the the software.     Maria Antonia Duke MD

## 2021-05-30 ENCOUNTER — RECORDS - HEALTHEAST (OUTPATIENT)
Dept: ADMINISTRATIVE | Facility: CLINIC | Age: 31
End: 2021-05-30

## 2021-05-30 VITALS — BODY MASS INDEX: 27.68 KG/M2 | WEIGHT: 146.6 LBS | HEIGHT: 61 IN

## 2021-05-30 NOTE — PROGRESS NOTES
Assessment/Plan:     Patient presents to clinic with ongoing right lower quadrant versus pelvic pain exclusively present with intercourse which remained s/p removal of the IUD.  She denies vaginal discharge, but a wet prep was performed today for completeness, did demonstrate yeast, treated with diflucan - unlikely to be the source of her focal pain.  She denies concerns about gonorrhea/chlamydia.  Transvaginal ultrasound ordered for later today, we will await results to determine next steps.     Problem List Items Addressed This Visit     None      Visit Diagnoses     RLQ abdominal pain    -  Primary    Relevant Orders    US Pelvis With Transvaginal Non OB    Wet Prep, Vaginal (Completed)    Pelvic joint pain, right        Relevant Orders    US Pelvis With Transvaginal Non OB    Wet Prep, Vaginal (Completed)    Yeast infection of the vagina        Relevant Medications    fluconazole (DIFLUCAN) 150 MG tablet          Return to clinic in 1 week after ultrasound.     Total time spent with patient was 15 minutes with greater than 50% spent in face-to-face counseling regarding the above plan.    Subjective:      Charlotte Clarke is a 29 y.o. female who presents to clinic for follow-up from ongoing pelvic pressure.    Patient first began noticing pelvic pressure with intercourse on the right side in the lower abdomen/pelvis approximately 5 months ago when patient's IUD was placed.  The pressure is only present during intercourse.  She believed it was attributable to the IUD, this was removed recently but the first instance of intercourse after removal resulted in the same discomfort.  She denies vaginal discharge.  She denies discomfort with any other activity aside from intercourse.      Past Medical History, Family History, and Social History reviewed.     No current outpatient medications on file prior to visit.     No current facility-administered medications on file prior to visit.        Review of systems is as  stated in HPI.  The remainder of the 10 system review is otherwise negative.    Objective:     /72   Pulse 75   SpO2 99%  There is no height or weight on file to calculate BMI.    Gen: Alert, NAD, appears stated age, normal hygiene   : Absence of pain with insertion of speculum, cervical motion tenderness absent, mild tenderness to palpation when bimanual exam performed on the right, absent discomfort on the left, no significant vaginal discharge, normal appearance of cervix    This note has been dictated using voice recognition software. Any grammatical or context distortions are unintentional and inherent to the the software.     Maria Antonia Duke MD

## 2021-05-30 NOTE — TELEPHONE ENCOUNTER
Called patient to discuss the findings on the ultrasound after discussing this with the radiologist. His differential is hemorrhagic ovarian cyst vs endometrioma. Left message with patient.

## 2021-05-30 NOTE — PROGRESS NOTES
CC: The patient is being seen as a consult from Dr Duke secondary to a retained IUD.    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  Attempt was made to remove it by Dr Duke on 19.  She had the strings trimmed as her partner could feel them.  She had a Nexplanon placed on 19.  The Mirena has been in place for 4-5 months; she wants it out as she's having significant cramping since having it placed.    Past Medical History:   Diagnosis Date     Depression        No past surgical history on file.    Patient's No family history on file.    Patient   Social History     Socioeconomic History     Marital status: Single     Spouse name: None     Number of children: None     Years of education: None     Highest education level: None   Occupational History     None   Social Needs     Financial resource strain: None     Food insecurity:     Worry: None     Inability: None     Transportation needs:     Medical: None     Non-medical: None   Tobacco Use     Smoking status: Former Smoker     Last attempt to quit: 2018     Years since quittin.4     Smokeless tobacco: Never Used   Substance and Sexual Activity     Alcohol use: No     Drug use: No     Sexual activity: Yes   Lifestyle     Physical activity:     Days per week: None     Minutes per session: None     Stress: None   Relationships     Social connections:     Talks on phone: None     Gets together: None     Attends [employer] service: None     Active member of club or organization: None     Attends meetings of clubs or organizations: None     Relationship status: None     Intimate partner violence:     Fear of current or ex partner: None     Emotionally abused: None     Physically abused: None     Forced sexual activity: None   Other Topics Concern     None   Social History Narrative     None       No current outpatient medications on file.     No current facility-administered medications for this visit.         Patient has No Known Allergies.    ROS:  12 part ROS is negative aside from those symptoms in the HPI    PE:  /60   Pulse 64   Ht 5' (1.524 m)   Wt 116 lb (52.6 kg)           Body mass index is 22.65 kg/m .    General: WN/WD WF, NAD  Pelvic: EG/BUS no lesions, no skin change   Vagina no lesions, no discharge   Cervix no lesions, no cervical motion tenderness, no strings present; long Charlotte clamp was used to explore the endocervical canal.  After several attempts the strings were grasped and the IUD was removed without difficulty.  The patient tolerated the procedure well.   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    Plan: Removal discussed with the patient.  Possible changes in the menstrual periods discussed with her as well.  We discussed that her cramps will likely improve now as well.

## 2021-05-30 NOTE — TELEPHONE ENCOUNTER
Called patient to discuss the options with respect to her ovarian mass: cyst vs endometrioma.     Patient and I chose to watchfully wait.  We would consider getting an MRI once enough time is passed to make additional imaging worthwhile per radiologist recommendation.  Low threshold to refer to OB/GYN.

## 2021-05-30 NOTE — PROGRESS NOTES
Procedure: Nexplanon Insertion and attempted IUD removal  Indication: Desire for contraception but does not like the Mirena  Contraindications: None    Subjective: Patient desires contraception. She has been informed of all of her available options with respect to contraception. She is confident in her decision. She was having pain with her IUD.    Objective.  Vitals:    07/12/19 1252   BP: 94/70   Pulse: 89   SpO2: 99%     Gen: NAD, slightly nervous  Psych: no pressured speech, no evidence of delusions    Procedure:  After adequate informed consent was provided and the consent form was signed, patient while in the supine position, the left arm was examined.   The left arm was positioned and at 10 cm proximal to the lateral epicondyle on inner arm but not between the muscles approx. 5 cc of 1% lidocaine with epinephrine was infiltrated into the anticipated insertion site after cleaning with alcohol pad. After adequate anesthesia was noted the area was prepped with Betadine. The Nexplanon was inserted without difficulty with the applicator, and was easily palpable in upper arm.   Sterile bandaid was applied, followed by a 4 x 4 gauze folded and held in place by Kerlix pressure bandage. No complications. Return precautions discussed.  Patient understands that removal should be done no later than 3 years.    However, we then proceeded to the IUD removal. Despite repeated attempts at visualization and use of the cytobrush, could not appreciate the IUD strings.    Contacted OB/GYN who was comfortable with the duplicate progesterone therapy for a short period of time and referred patient to GYN for IUD removal. Patient's UPT was negative.     Maria Antonia Duke MD  Family Medicine [facility]

## 2021-05-31 VITALS — BODY MASS INDEX: 22.48 KG/M2 | WEIGHT: 119 LBS

## 2021-05-31 VITALS — WEIGHT: 120 LBS | BODY MASS INDEX: 22.66 KG/M2 | HEIGHT: 61 IN

## 2021-06-01 ENCOUNTER — RECORDS - HEALTHEAST (OUTPATIENT)
Dept: ADMINISTRATIVE | Facility: CLINIC | Age: 31
End: 2021-06-01

## 2021-06-01 VITALS — HEIGHT: 61 IN | WEIGHT: 154.7 LBS | BODY MASS INDEX: 29.21 KG/M2

## 2021-06-01 VITALS — WEIGHT: 127 LBS | BODY MASS INDEX: 24 KG/M2

## 2021-06-01 VITALS — BODY MASS INDEX: 25.51 KG/M2 | WEIGHT: 135 LBS

## 2021-06-01 VITALS — BODY MASS INDEX: 25.97 KG/M2 | WEIGHT: 133 LBS

## 2021-06-01 VITALS — BODY MASS INDEX: 23.62 KG/M2 | WEIGHT: 125 LBS

## 2021-06-01 VITALS — WEIGHT: 133 LBS | BODY MASS INDEX: 25.13 KG/M2

## 2021-06-01 VITALS — WEIGHT: 150 LBS | BODY MASS INDEX: 28.34 KG/M2

## 2021-06-01 VITALS — WEIGHT: 138 LBS | BODY MASS INDEX: 26.07 KG/M2

## 2021-06-01 VITALS — WEIGHT: 120 LBS | BODY MASS INDEX: 22.67 KG/M2

## 2021-06-01 VITALS — BODY MASS INDEX: 29.29 KG/M2 | WEIGHT: 155 LBS

## 2021-06-01 VITALS — BODY MASS INDEX: 23.05 KG/M2 | WEIGHT: 122 LBS

## 2021-06-01 VITALS — WEIGHT: 134 LBS | BODY MASS INDEX: 25.32 KG/M2

## 2021-06-01 VITALS — WEIGHT: 147 LBS | BODY MASS INDEX: 27.78 KG/M2

## 2021-06-01 NOTE — TELEPHONE ENCOUNTER
Who is calling:  Patient called back.  Reason for Call:  States that Dr Robinson called and left a message to call her back.  No encounter noted in patients chart regarding a call.  Please call her back.  Date of last appointment with primary care: 7/22/2019  Okay to leave a detailed message: Yes

## 2021-06-02 VITALS — HEIGHT: 60 IN | WEIGHT: 137 LBS | BODY MASS INDEX: 26.9 KG/M2

## 2021-06-02 VITALS — BODY MASS INDEX: 24.54 KG/M2 | HEIGHT: 60 IN | WEIGHT: 125 LBS

## 2021-06-02 VITALS — WEIGHT: 116 LBS | BODY MASS INDEX: 22.65 KG/M2

## 2021-06-03 VITALS — BODY MASS INDEX: 22.78 KG/M2 | HEIGHT: 60 IN | WEIGHT: 116 LBS

## 2021-06-03 VITALS
BODY MASS INDEX: 23.75 KG/M2 | DIASTOLIC BLOOD PRESSURE: 70 MMHG | HEIGHT: 60 IN | SYSTOLIC BLOOD PRESSURE: 108 MMHG | HEART RATE: 92 BPM | OXYGEN SATURATION: 99 % | WEIGHT: 121 LBS

## 2021-06-03 VITALS — WEIGHT: 113 LBS | HEIGHT: 60 IN | BODY MASS INDEX: 22.19 KG/M2

## 2021-06-03 VITALS — HEIGHT: 60 IN | WEIGHT: 117 LBS | BODY MASS INDEX: 22.97 KG/M2

## 2021-06-04 VITALS
DIASTOLIC BLOOD PRESSURE: 54 MMHG | SYSTOLIC BLOOD PRESSURE: 118 MMHG | BODY MASS INDEX: 23.53 KG/M2 | OXYGEN SATURATION: 99 % | WEIGHT: 120.5 LBS | HEART RATE: 101 BPM

## 2021-06-04 NOTE — PROGRESS NOTES
Procedure: Nexplanon Removal  Indication: Desire for contraception but does not prefer the nexplanon at this time  Contraindications: None    Subjective: Patient desires contraception. She has been informed of all of her available options with respect to contraception. She is confident in her decision to have the nexplanon removed. She is interested in either the IUD, the oral pill or the nuvaring.    Objective.  Vitals:    12/06/19 1431   BP: 108/70   Pulse: 92   SpO2: 99%     Gen: NAD, slightly nervous  Psych: no pressured speech, no evidence of delusions    Procedure:  After adequate informed consent was provided and the consent form was signed, patient while in the supine position, the left arm was examined. The nexplanon was carefully palpated on the medial portion of the arm. Localization of the removal site was noted.  The area was prepped with an alcohol pad. Next, approx. 5 cc of 1% lidocaine with epinephrine was infiltrated into the anticipated removal site. After adequate anesthesia was noted, betadine was applied and a number 11 blade was used to break the skin and a 3-4 mm incision was made transversely over palpated implant.  At this point the curved Charlotte clamp was placed just beneath the skin and guided to Nexplanon.The product was secured and drawn towards the incision site. With gentle traction the product was removed without difficulty. At this time assessment of the operative site noted no significant bleeding.   Incision was closed with a bandaid followed by a 4 x 4 gauze folded and held in place by Kerlix pressure bandage.    Assessment/Plan: Patient and I had an extensive discussion about all of her contraceptive options.  Originally she was more interested in the Mirena IUD and the oral combination pill versus the NuvaRing.  However, it came apparent the patient believes the NuvaRing was the diaphragm, and with further education became convinced that the NuvaRing may be a viable option for  her.  We will trial this for 1 to several months and could always switch to either the Mirena or the oral combination contraceptive in the future.  NuvaRing prescribed after Nexplanon removal per patient preference.    Maria Antonia Duke MD  Family Medicine [facility]

## 2021-06-05 NOTE — TELEPHONE ENCOUNTER
Left message to call back for: IUD placement  Information to relay to patient:  Please notify patient she does not need a consultation with Dr. Kaur for an IUD placement and she can check thyroid labs at the time of her mirena IUD placement.     Please help reschedule patients appointment for 40 minutes with Dr. Kaur. Ideally she would like to place the IUD the week after her menstrual period.

## 2021-06-05 NOTE — TELEPHONE ENCOUNTER
Who is calling:  The patient.  Reason for Call:  The patient has an upcoming appointment and would like to know if she needs to have a consult with her primary first?  The patient states that she has it before.  Date of last appointment with primary care: 12-06-19  Okay to leave a detailed message: Yes

## 2021-06-05 NOTE — TELEPHONE ENCOUNTER
"Before I call her are you able to do her \"thyroid check\" at the same time as mirena IUD placement?  Also do you want her Mirena to be placed at a specific time in relation to her menstrual period?   "

## 2021-06-05 NOTE — TELEPHONE ENCOUNTER
Left message to call back for: appointment question  Information to relay to patient:  The below message is quite confusing. Please clarify and see what questions she has?

## 2021-06-05 NOTE — TELEPHONE ENCOUNTER
Who is calling:  patient  Reason for Call:  appt kept for 02/14 at 10:40 as a 40min appt  Date of last appointment with primary care: NA  Okay to leave a detailed message: No call back needed

## 2021-06-05 NOTE — TELEPHONE ENCOUNTER
Patient Returning Call  Reason for call:  Returning call  Information relayed to patient:    Relayed below message to patient.  Patient has additional questions:  Yes  If YES, what are your questions/concerns:    Patient is questioning why she has to come in and talk to the Provider first about the Mirena instead of being able to have the Mirena placed at the same appointment.  Patient does not want to take off 2 days from work.  Please call patient after 3:30pm today as she would like to talk to care team or Provider.  Thank you.    Okay to leave a detailed message?: Yes    Patient is seeing Dr Kaur on 2/14/2020

## 2021-06-05 NOTE — TELEPHONE ENCOUNTER
We can check her thyroid at that visit.  Ideally, I like to place the Mirena the week after her menstrual cycle ends

## 2021-06-06 NOTE — PROGRESS NOTES
Insertion of IUD  Date/Time: 2/14/2020 9:45 AM  Performed by: Terri Lundy MD  Authorized by: Terri Lundy MD   Consent: Verbal consent obtained. Written consent obtained.  Risks and benefits: risks, benefits and alternatives were discussed  Consent given by: patient  Comments: IUD Insertion Procedure Note    Pre-operative Diagnosis: Desire for contraception    Post-operative Diagnosis: same    Indications: contraception    History: The patient is requesting Mirena IUD insertion. She has had an IUD in place previously and had it removed due to cramping. She otherwise tolerated it well. She has since realized that her cramping was actually better with the IUD in place.    Procedure Details   Urine pregnancy test was done today and result was negative.  The risks (including infection, bleeding, pain, and uterine perforation) and benefits of the procedure were explained to the patient and written informed consent was obtained.      Bimanual exam: normal single, nontender  Speculum placed.  Cervix appears normal.  Cervix cleansed with Betadine. Tenaculum applied to the cervix at 12 O'clock and gentle traction applied.  Cervical Os finder not needed. Uterus sounded to 7.5 cm. IUD inserted following aseptic technique by usual protocol without difficulty. String visible and trimmed to 3 cm. Patient tolerated procedure very well.    IUD Information:  Mirena, Lot # [id number], Expiration date 4/2022.    Condition:  Stable    Complications:  None    Plan:  The patient was given after care instructions.

## 2021-06-06 NOTE — PATIENT INSTRUCTIONS - HE
Intrauterine Device Insertion   Patient Instructions    WHAT TO EXPECT AFTER THE PROCEDURE:    Insertion of the IUD may cause some discomfort, such as cramping. The cramping should improve after the IUD is in place. You may have bleeding after the procedure. This is normal. It varies from light spotting for a few days to menstrual-like bleeding.  You may experience irregular bleeding for 3-6 months after IUD is placed and this is normal.  After 6 months, some patients don't have menses at all.  Some patients continue to have menses monthly but they are usually lighter with reduced cramping.  When the IUD is in place, a string will extend past the cervix into the vagina for 1-2 inches. The strings should not bother you or your partner.   HOME CARE INSTRUCTIONS:     1) Ibuprofen 2-3 tabs every 6 hours as needed for pain or cramping.  2) We will notify you of results of Gonorrhea/Chlamydia testing when available.  This test is standard when a IUD is placed.  3) Birth Control: No additional birth control needed.  4) Schedule a follow up appointment with Dr. Lundy in 4-6 weeks.  5) Check to make sure you can feel the strings once a month.  You should schedule an appointment to be seen if you can't the feel strings.  You should have the strings checked by exam with a healthcare provider at least once per year.  6) The  IUD does NOT protect against sexually transmitted diseases.  You should use condoms if you are at risk of enedina a sexually transmitted disease.  You should be seen promptly if you develop symptoms or have a known exposure.  The best way to reduce risk of STDs is to have a single monogamous relationship.  7) Mild to moderate bleeding and cramping are normal after placement of IUD.  You should be seen if you are having severe symptoms.  8) Please feel free to call with any questions or concerns.  SEEK MEDICAL CARE IF:     You have bleeding that is heavier or lasts longer than a normal menstrual  cycle.    You have a fever.    You have increasing cramps or abdominal pain not relieved with medicine.    You have abdominal pain that does not seem to be related to the same area of earlier cramping and pain.    You are lightheaded, unusually weak, or faint.    You have abnormal vaginal discharge or smells.    You have pain during sexual intercourse.    You cannot feel the IUD strings, or the IUD string has gotten longer.    You feel the IUD at the opening of the cervix in the vagina.    You think you are pregnant, or you miss your menstrual period.    The IUD string is hurting your sex partner.

## 2021-06-06 NOTE — TELEPHONE ENCOUNTER
A message was left asking this patient to call and reschedule, please disregard that message. This has changed and you should keep your appt. w/ Dr. Kaur on 2/14/20 @ 10:40am. We will see you tomorrow.     Amalia Phillip RN

## 2021-06-07 NOTE — TELEPHONE ENCOUNTER
Travel Screen complete    30 y/o calls about new productive cough, stomache pain, runny nose, no fever, she is concerned about her symptoms being related to COVID, RN completed protocol on cough, protocols indicate home care for symptoms of cold, however, patient was directed to [email or web address] for appointment as she requests for COVID treatment.    Amalia Vital RN Triage Nurse/[facility]  04/08/2020   7:48AM     Reason for Disposition    Cough    Protocols used: COUGH - ACUTE [id number]

## 2021-06-08 NOTE — PROGRESS NOTES
Charlotte is a 26 y.o. female presenting to the clinic for concerns for pelvic pain.  Patient states she has been working overnight.  She'll wake up with pelvic pain at 8 AM.  She states the pain lasts 10-20 minutes 2 times per week.  This has been ongoing for 3 months.  She describes the discomfort as menstrual cramping.  She is unsure of her last menstrual period but believes it was 6 months ago.  She denies dysuria, hematuria, urinary urgency, urinary frequency.  She has had some vaginal discharge but denies irritation and itching.  She has been experiencing diarrhea once per week.  She denies any blood or mucus in the stool.  She has not had nausea or vomiting.  No fever has been present. She stopped her OCP six months ago.  She and her boyfriend bought a house and are thinking about trying to have a baby.  Her periods were irregular prior to taking OCP's.     Review of Systems: A complete 14 point review of systems was obtained and is negative or as stated in the history of present illness.    Social History     Social History     Marital status: Single     Spouse name: N/A     Number of children: N/A     Years of education: N/A     Occupational History     Not on file.     Social History Main Topics     Smoking status: Not on file     Smokeless tobacco: Not on file     Alcohol use Not on file     Drug use: Not on file     Sexual activity: Not on file     Other Topics Concern     Not on file     Social History Narrative       Active Ambulatory Problems     Diagnosis Date Noted     Skin Symptoms      Headache      Pain During Urination (Dysuria)      Essential Hematuria      Amenorrhea      Pregnancy      Menorrhagia            Resolved Ambulatory Problems     Diagnosis Date Noted     No Resolved Ambulatory Problems     No Additional Past Medical History       No family history on file.    OBJECTIVE:     Visit Vitals     /62 (Patient Site: Right Arm, Patient Position: Sitting, Cuff Size: Adult Regular)  "    Pulse 71     Temp 98.1  F (36.7  C)     Ht 5' 1\" (1.549 m)     Wt 146 lb 9.6 oz (66.5 kg)     SpO2 97%     BMI 27.7 kg/m2       Patient is alert, in no obvious distress.   Skin: Warm, dry.    Lungs:  Clear to auscultation. Respirations even and unlabored.  No wheezing or rales noted.   Heart:  Regular rate and rhythm.  No murmurs.   Abdomen: Soft, nontender.  No organomegaly. Bowel sounds normoactive. No guarding or masses noted.   :  External genitalia normal.  Normal vaginal mucosa.  Cervix no lesions or cervical motion tendernes.      LABORATORY: Urine pregnancy test ordered is negative.  Urinalysis showed trace blood.     ASSESSMENT AND PLAN:     1. Amenorrhea  Pregnancy, Urine    Thyroid Cascade    Prolactin   2. Pelvic pain  Urinalysis-UC if Indicated    Chlamydia trachomatis & Neisseria gonorrhoeae, Amplified Detection    Wet Prep, Vaginal    Urine,Microscopic    Culture, Urine    HM2(CBC w/o Differential)    Comprehensive Metabolic Panel    CANCELED: Wet Prep, Vaginal     Discussed possible urinary tract infection, STDs, nephrolithiasis, ovarian cyst, irritable bowel syndrome.  Urine pregnancy test is negative.  Urinalysis showed trace blood.  We'll check urine culture results for possible urinary tract infection or nephrolithiasis.  Discussed symptomatic treatment.  Will rule out STDs and vaginal infection.  She declines pelvic ultrasound and states her insurance will not cover that.  Further plans pending the results.  She is content with the plan. I spent 25 minutes with the patient with greater than 50% spent discussing symptoms, treatment options, and coordination of care.     "

## 2021-06-12 NOTE — TELEPHONE ENCOUNTER
Called patient who reports she was seen at [facility]. She said the provider she saw recommended she take Codeine for pain and that she is to call her PCP's office for a prescription.     With patients permission I will call [facility] to get more information.    -Spoke with Irene ([facility]) - patient has a corneal abrasion that is approximately 70 percent healed. Patient was prescribed eye drops for this and advised to use tylenol for pain. The provider who saw patient is an optometrist therefore unable to prescribe pain medication. Patient did not feel tylenol would be sufficient for pain relief therefore the optometrist notified patient she could contact her primary care providers office to prescribe pain medication if she feels appropriate.    Please advise

## 2021-06-12 NOTE — TELEPHONE ENCOUNTER
Reason contacted:  Medication request  Information relayed:  Below message relayed to patient.   Additional questions:  No  Further follow-up needed:  No  Okay to leave a detailed message:  No

## 2021-06-12 NOTE — TELEPHONE ENCOUNTER
Medication Request  Medication name: Something for eye pain pain.    Requested Pharmacy: [facility]  Reason for request: The patient states her 2 year old daughter accidentally stuck her finger in the patient's left eye on 10/6/20 which resulted in a chunk of her eyeball taken out. The patient was seen at the [facility] [place] in [place] and was given some antibiotic drops but cannot prescribe opioids. The patient would like a call to discuss the above.  When did you use medication last?:  NA  Patient offered appointment:  N/A - electronic request  Okay to leave a detailed message: yes

## 2021-06-12 NOTE — TELEPHONE ENCOUNTER
Suy[facility] from [facility] calling, the codeine that was ordered on 10/8/2020 is not in stock & pt agrees to have it sent to the [facility] in [facility].  Please resend so pt can  today  [facility]  Phone [phone]  Fax [phone]  Routing to [facility]  Saundra Gomez RN  [facility] Nurse Advisor    Reason for Disposition    Pharmacy calling with prescription questions and triager unable to answer question    Additional Information    Negative: Drug overdose and triager unable to answer question    Negative: Caller requesting information unrelated to medicine    Negative: Caller requesting a prescription for Strep throat and has a positive culture result    Negative: Rash while taking a medication or within 3 days of stopping it    Negative: Immunization reaction suspected    Negative: Asthma and having symptoms of asthma (cough, wheezing, etc.)    Negative: Breastfeeding questions about mother's medicines and diet    Negative: MORE THAN A DOUBLE DOSE of a prescription or over-the-counter (OTC) drug    Negative: DOUBLE DOSE (an extra dose or lesser amount) of over-the-counter (OTC) drug and any symptoms (e.g., dizziness, nausea, pain, sleepiness)    Negative: DOUBLE DOSE (an extra dose or lesser amount) of prescription drug and any symptoms (e.g., dizziness, nausea, pain, sleepiness)    Negative: Took another person's prescription drug    Negative: DOUBLE DOSE (an extra dose or lesser amount) of prescription drug and NO symptoms (Exception: a double dose of antibiotics)    Negative: Diabetes drug error or overdose (e.g., took wrong type of insulin or took extra dose)    Negative: Caller has medication question about med not prescribed by PCP and triager unable to answer question (e.g., compatibility with other med, storage)    Negative: Prescription not at pharmacy and was prescribed today by PCP    Negative: Request for URGENT new prescription or refill of 'essential' medication (i.e., likelihood of harm to  patient if not taken) and triager unable to fill per department policy    Protocols used: MEDICATION QUESTION CALL-A-[place]

## 2021-06-15 NOTE — PROGRESS NOTES
Assessment:    1. Lower abdominal pain  Urinalysis-UC if Indicated    Pregnancy, Urine    Culture, Urine   2. Metrorrhagia     3. Amenorrhea  Pregnancy, Urine   4. Vomiting           Plan:    25 minutes total time with patient, > 50% with counseling and coordination of cares.  Discussed findings today consistent with early pregnancy.  Urine pregnancy test positive.  UA UC pending.  Estimated gestational age 9 weeks with EDC approximately September 11, 2018.  Prenatal vitamin reviewed.  Discontinue quarter pack per day smoking.  Patient will schedule with Dr. Maria Antonia Duke next week for further management of pregnancy.  Ensure adequate hydration with symptoms consistent with mild morning sickness.        Subjective:    Charlotte Clarke is seen today for lower abdominal discomfort.  Mild.  LMP perhaps early December perhaps December 5.  Periods have typically been irregular.  Previously on OCP however discontinued several months ago.  3 episodes of vomiting since Monday.  No vaginal discharge.  No diarrhea.  Her nephew had been sick with cold symptoms without gastroenteritis however.  Comprehensive review of systems as above otherwise all negative.  Admits to smoking quarter pack per day.    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     No family history on file.     No past medical history on file.     Social History   Substance Use Topics     Smoking status: Current Every Day Smoker     Smokeless tobacco: Never Used     Alcohol use None        Current Outpatient Prescriptions   Medication Sig Dispense Refill     LUTERA, 28, 0.1-20 mg-mcg per tablet TAKE ONE TABLET BY MOUTH EVERY DAY. 84 tablet 0     No current facility-administered medications for this visit.           Objective:    Vitals:    02/07/18 1328   BP:  110/60   Temp: 98.2  F (36.8  C)   Weight: 119 lb (54 kg)      Body mass index is 22.48 kg/(m^2).    Alert.  No apparent distress.  Chest clear.  Cardiac exam regular.  Abdomen soft, benign without significant abdominal enlargement.  Positive bowel sounds.  Extremities warm and dry.

## 2021-06-16 PROBLEM — Z97.5 IUD (INTRAUTERINE DEVICE) IN PLACE: Status: ACTIVE | Noted: 2020-02-14

## 2021-06-16 NOTE — PROGRESS NOTES
Assessment/Plan:     Patient presents for nausea and vomiting of pregnancy.  We discussed several symptoms of pregnancy and treatment for nausea and vomiting.  Handout was given to patient as well as a packet about early pregnancy questions.  Recommended the patient follow-up in a week for a full evaluation and obstetrical history and family history, etc.    Problem List Items Addressed This Visit     None      Visit Diagnoses     Encounter for supervision of other normal pregnancy in first trimester    -  Primary    Relevant Orders    Urinalysis Macroscopic (Completed)    Culture, Urine    US OB < 14 Weeks With Transvaginal    ABO/RH Typing (OP order)    Hepatitis B Surface antigen (HBsAG)    HIV Antigen/Antibody Screening Cascade    HM1(CBC and Differential)    HML Antibody Screen    RPR    Rubella Immune Status (IgG)    HM1 (CBC with Diff)          Return to clinic 1 week for first OB.    Total time spent with patient was  minutes with greater than 50% spent in face-to-face counseling regarding the above plan.    Subjective:      Charlotte Clarke is a 27 y.o. female who presents to clinic for nausea/vomiting.    Patient was diagnosed as being pregnant at a previous office visit.  She is a  who presents for significant nausea and vomiting related to her pregnancy.  She is currently trying frequent meals but is doing nothing else for her symptoms.  She has several questions about what she can and cannot do in her work environment where she exposed to toxic chemicals.  She presents with her  who is also very excited about the pregnancy.      Past Medical History, Family History, and Social History reviewed.     Current Outpatient Prescriptions on File Prior to Visit   Medication Sig Dispense Refill     LUTERA, 28, 0.1-20 mg-mcg per tablet TAKE ONE TABLET BY MOUTH EVERY DAY. 84 tablet 0     No current facility-administered medications on file prior to visit.        Review of systems is as stated in  "HPI.  The remainder of the 10 system review is otherwise negative.    Objective:     /64  Pulse 79  Ht 5' 1\" (1.549 m)  Wt 120 lb (54.4 kg)  LMP 01/03/2018  SpO2 99%  BMI 22.67 kg/m2 Body mass index is 22.67 kg/(m^2).    Gen: Alert, NAD, appears stated age, normal hygiene   Psych: no apparent hallucinations or delusions, no pressured speech; alert, oriented x3    This note has been dictated using voice recognition software. Any grammatical or context distortions are unintentional and inherent to the the software.     Maria Antonia Duke MD      "

## 2021-06-16 NOTE — PROGRESS NOTES
Subjective:  Charlotte Clarke is a 27 y.o.  2, para 1, ab 0 at 9w1d by certain dating.     Concerns today: LLQ abdominal pain    REVIEW OF SYMPTOMS:  Morning sickness: yes, vomiting daily x 3 before work  Fatigue: yes  Heartburn: no  Cramping/contractions: no  Vaginal bleeding: no   Vaginal discharge: no  Leaking of fluid: no   Urinary symptoms: no  GI symptoms: no  Fetal movement: no  Taking prenatal vitamins: yes  Headaches: no  Visual changes: no  RUQ pain: no  Edema: no    PMH:  No past medical history on file.    OBGYN Hx:   1 termination at first trimester    PSH:  No past surgical history on file.    Meds:  Current Outpatient Prescriptions on File Prior to Visit   Medication Sig Dispense Refill     LUTERA, 28, 0.1-20 mg-mcg per tablet TAKE ONE TABLET BY MOUTH EVERY DAY. 84 tablet 0     No current facility-administered medications on file prior to visit.        Allergy:   No Known Allergies    Family Hx:  No family history on file.    Social Hx:   Smoking: no  EtOH/drugs: no  Employment: [employer]   Living situation/relationships: lives with  and puppy and two bunnies, feels safe at home    PHYSICAL EVALUATION:  /70  Wt 119 lb (54 kg)  LMP 2018  BMI 22.48 kg/m2  Gen: Alert, NAD, appears stated age, normal hygiene   Eyes: conjunctivae without injection, sclera clear, EOMI  ENT/mouth: nares clear, septum midline, absent rhinorrhea, throat without injection, neck is supple, no thyroid enlargement  CV: RRR, no murmur appreciated, pedal edema absent bilaterally  Resp: CTAB, no wheezes, rales or ronchi  ABD: normoactive, non-tender to palpation, nondistended  MSK: grossly full range of motion in all joints, no obvious deformity  Neuro: CN II-XII grossly intact, no deficits in coordination  Psych: no apparent hallucinations or delusions, no pressured speech; alert, oriented x3  SKIN: dry and without lesions  Heme/lymph: no pallor, no active bleeding/bruising, no adenopathy  appreciated    Labs:  See epic, no concerns    Imaginw3d ultrasound, no change in dating    A/P:  1. 9 week 1 days intrauterine pregnancy. FHT absent but early in gestational age  --Prenatal labs ordered: gonorrhea/chlamydia  --Genetic Screening: desired most likely, handout given  --Vaccinations: essentially up to date, missed HPV and influenza  --Encouraged prenatal vitamin use    2. Chronic Problems: none    3. Nausea with vomiting but without dehydration or weight loss  -Patient has not yet taking any B6 or Unisom, she will try that first and will report back with results.  Will escalate as needed.    Discussed:    Teratogens reviewed.  Infectious/STD risk reviewed.  Toxoplasmosis risk reviewed.  No concern for smoking, alcohol/drugs, domestic violence.  Nutrition and weight gain, exercise, dietary restrictions discussed.  Seat belt use reviewed.    RTC in 4wks or sooner PRN

## 2021-06-17 NOTE — PATIENT INSTRUCTIONS - HE
Patient Instructions by Maria Antonia Duke MD at 6/14/2019  1:50 PM     Author: Maria Antonia Duke MD Service: -- Author Type: Physician    Filed: 6/14/2019  2:29 PM Encounter Date: 6/14/2019 Status: Addendum    : Maria Antonia Duke MD (Physician)    Related Notes: Original Note by Maria Antonia Duke MD (Physician) filed at 6/14/2019  2:29 PM       1.  prozac, start tomorrow.  2. Hydroxyzine to help you fall asleep initially if you have trouble. If you feel particularly anxious, ok to take one.   3. Melatonin for night time awakening.  4. Go get sleep study done.  5. Consider individual or couples counseling.          Patient Education     Fluoxetine capsules or tablets (Depression/Mood Disorders)  Brand Name: Prozac  What is this medicine?  FLUOXETINE (floo OX e teen) belongs to a class of drugs known as selective serotonin reuptake inhibitors (SSRIs). It helps to treat mood problems such as depression, obsessive compulsive disorder, and panic attacks. It can also treat certain eating disorders.  How should I use this medicine?  Take this medicine by mouth with a glass of water. Follow the directions on the prescription label. You can take this medicine with or without food. Take your medicine at regular intervals. Do not take it more often than directed. Do not stop taking this medicine suddenly except upon the advice of your doctor. Stopping this medicine too quickly may cause serious side effects or your condition may worsen.  A special MedGuide will be given to you by the pharmacist with each prescription and refill. Be sure to read this information carefully each time.  Talk to your pediatrician regarding the use of this medicine in children. While this drug may be prescribed for children as young as 7 years for selected conditions, precautions do apply.  What side effects may I notice from receiving this medicine?  Side effects that you should report to your doctor or health care  professional as soon as possible:    allergic reactions like skin rash, itching or hives, swelling of the face, lips, or tongue    anxious    black, tarry stools    breathing problems    changes in vision    confusion    elevated mood, decreased need for sleep, racing thoughts, impulsive behavior    eye pain    fast, irregular heartbeat    feeling faint or lightheaded, falls    feeling agitated, angry, or irritable    hallucination, loss of contact with reality    loss of balance or coordination    loss of memory    painful or prolonged erections    restlessness, pacing, inability to keep still    seizures    stiff muscles    suicidal thoughts or other mood changes    trouble sleeping    unusual bleeding or bruising    unusually weak or tired    vomiting  Side effects that usually do not require medical attention (report to your doctor or health care professional if they continue or are bothersome):    change in appetite or weight    change in sex drive or performance    diarrhea    dry mouth    headache    increased sweating    nausea    tremors  What may interact with this medicine?  Do not take this medicine with any of the following medications:    other medicines containing fluoxetine, like Sarafem or Symbyax    cisapride    linezolid    MAOIs like Carbex, Eldepryl, Marplan, Nardil, and Parnate    methylene blue (injected into a vein)    pimozide    thioridazine  This medicine may also interact with the following medications:    alcohol    amphetamines    aspirin and aspirin-like medicines    carbamazepine    certain medicines for depression, anxiety, or psychotic disturbances    certain medicines for migraine headaches like almotriptan, eletriptan, frovatriptan, naratriptan, rizatriptan, sumatriptan, zolmitriptan    digoxin    diuretics    fentanyl    flecainide    furazolidone    isoniazid    lithium    medicines for sleep    medicines that treat or prevent blood clots like warfarin, enoxaparin, and  dalteparin    NSAIDs, medicines for pain and inflammation, like ibuprofen or naproxen    phenytoin    procarbazine    propafenone    rasagiline    ritonavir    supplements like Orlinda's wort, kava kava, valerian    tramadol    tryptophan    vinblastine  What if I miss a dose?  If you miss a dose, skip the missed dose and go back to your regular dosing schedule. Do not take double or extra doses.  Where should I keep my medicine?  Keep out of the reach of children.  Store at room temperature between 15 and 30 degrees C (59 and 86 degrees F). Throw away any unused medicine after the expiration date.  What should I tell my health care provider before I take this medicine?  They need to know if you have any of these conditions:    bipolar disorder or a family history of bipolar disorder    bleeding disorders    glaucoma    heart disease    liver disease    low levels of sodium in the blood    seizures    suicidal thoughts, plans, or attempt; a previous suicide attempt by you or a family member    take MAOIs like Carbex, Eldepryl, Marplan, Nardil, and Parnate    take medicines that treat or prevent blood clots    thyroid disease    an unusual or allergic reaction to fluoxetine, other medicines, foods, dyes, or preservatives    pregnant or trying to get pregnant    breast-feeding  What should I watch for while using this medicine?  Tell your doctor if your symptoms do not get better or if they get worse. Visit your doctor or health care professional for regular checks on your progress. Because it may take several weeks to see the full effects of this medicine, it is important to continue your treatment as prescribed by your doctor.  Patients and their families should watch out for new or worsening thoughts of suicide or depression. Also watch out for sudden changes in feelings such as feeling anxious, agitated, panicky, irritable, hostile, aggressive, impulsive, severely restless, overly excited and hyperactive, or not  being able to sleep. If this happens, especially at the beginning of treatment or after a change in dose, call your health care professional.  You may get drowsy or dizzy. Do not drive, use machinery, or do anything that needs mental alertness until you know how this medicine affects you. Do not stand or sit up quickly, especially if you are an older patient. This reduces the risk of dizzy or fainting spells. Alcohol may interfere with the effect of this medicine. Avoid alcoholic drinks.  Your mouth may get dry. Chewing sugarless gum or sucking hard candy, and drinking plenty of water may help. Contact your doctor if the problem does not go away or is severe.  This medicine may affect blood sugar levels. If you have diabetes, check with your doctor or health care professional before you change your diet or the dose of your diabetic medicine.  NOTE:This sheet is a summary. It may not cover all possible information. If you have questions about this medicine, talk to your doctor, pharmacist, or health care provider. Copyright  2018 [employer]

## 2021-06-17 NOTE — PROGRESS NOTES
Patient is a 27 y.o.  at 13w2d here for routine OB visit.     Subjective:   Specific concerns: none    Morning sickness: no, improved   Fatigue: no  Heartburn: no  Cramping/contractions: no  Vaginal bleeding: no   Vaginal discharge: no  Leaking of fluid: no   Urinary symptoms: no  GI symptoms: no  Fetal movement: no  Taking prenatal vitamins: yes  Headaches: no  Visual changes: no  RUQ pain: no  Edema: no    Objective:  /68  Wt 120 lb (54.4 kg)  LMP 2018  BMI 22.67 kg/m2  Fundal height: n/a  FHTs: 158  Edema: absent  Cervical exam: n/a    Assessment: 27 y.o.  at 13w2d by LMP c/w 9w3d sono here for routine OB visit.   Plan:  Routine prenatal care    Return to clinic in 4 week(s).

## 2021-06-17 NOTE — PROGRESS NOTES
Patient is a 27 y.o.  at 17w2d here for routine OB visit.     Subjective:   Specific concerns: sharp pain in lower stomach, lasted one week, now resolved    Morning sickness: no   Fatigue: no  Heartburn: no  Cramping/contractions: no  Vaginal bleeding: no   Vaginal discharge: no  Leaking of fluid: no   Urinary symptoms: no  GI symptoms: no  Fetal movement: yes  Taking prenatal vitamins: yes  Headaches: no  Visual changes: no  RUQ pain: no  Edema: feet a little    Objective:  /60  Wt 122 lb (55.3 kg)  LMP 2018  BMI 23.05 kg/m2  Fundal height: n/a  FHTs: 147  Edema: absent  Cervical exam: n/a    Assessment: 27 y.o.  at 17w2d by LMP c/w 9w3d sono here for routine OB visit.   Plan:  Routine prenatal care  PAP next appointment  Anatomic survey ordered today    Return to clinic in 4 week(s).

## 2021-06-17 NOTE — PATIENT INSTRUCTIONS - HE
Patient Instructions by Maria Antonia Duke MD at 1/28/2019  2:50 PM     Author: Maria Antonia Duke MD Service: -- Author Type: Physician    Filed: 1/28/2019  2:58 PM Encounter Date: 1/28/2019 Status: Addendum    : Maria Antonia Duke MD (Physician)    Related Notes: Original Note by Maria Antonia Duke MD (Physician) filed at 1/28/2019  2:54 PM         Patient Education     Molluscum Contagiosum (Adult)  Molluscum contagiosum is a common skin infection. It is caused by a pox virus. The infection results in raised, flesh-colored bumps with central umbilication on the skin. The bumps are sometimes itchy, but not painful. They may spread or form lines when scratched. Almost any area of skin can be affected. Common sites include the face, neck, armpit, arms, hands, and genitals.    Molluscum contagiosum spreads easily from one part of the body to another. It spreads through scratching or other contact. It can also spread from person to person. This often happens through shared clothing, towels, or objects such as shared sports gear. It can spread during contact sports or sexual contact.  Because it is caused by a virus, antibiotics do not help. The infection usually goes away on its own within a period of 6 to 18 months, but will spread if not treated. The infection may continue in people with a weakened immune system. This includes people with diabetes, cancer, or HIV.  If the bumps are bothersome or unsightly, treatment may remove them. This may include scraping, freezing, or by applying an acid, blistering solution, or a cream that modulates the immune system.  Home care  The healthcare provider can prescribe a medicine to help the bumps heal. Follow the providers instructions for using these medicines.    The following are general care guidelines:    Avoid scratching the rash. Scratching spreads the infection. If needed, cover affected skin with bandages to help prevent scratching.    Wash your hands  before and after caring for the rash.    Do not share towels, washcloths, or clothing with anyone.    Avoid shaving any areas where the bumps are present.    Avoid sex if the bumps are in the genital area.    If participating in contact sports or other activity that involves skin-to-skin contact, cover all affected skin with clothing or bandages.    Avoid swimming in public pools until the rash clears.  Follow-up care  Follow up with your healthcare provider, or as advised.  When to seek medical advice  Call your healthcare provider right away if any of these occur:    Fever of 100.4 F (38 C) or higher    Signs of infection, such as warmth, pain, oozing, or redness    Bumps appear on a new area of the body or seem to be spreading rapidly  Date Last Reviewed: 1/12/2016 2000-2017 [employer]. [address] [place] [id number]. All rights reserved. This information is not intended as a substitute for professional medical care. Always follow your healthcare professional's instructions.

## 2021-06-18 NOTE — PROGRESS NOTES
Patient is a 28 y.o.  at 22w2d here for routine OB visit.     Subjective:   Specific concerns: none    Morning sickness: yes once a week  Fatigue: no  Heartburn: no  Cramping/contractions: no  Vaginal bleeding: no   Vaginal discharge: no  Leaking of fluid: no   Urinary symptoms: no  GI symptoms: no  Fetal movement: yes  Taking prenatal vitamins: yes  Headaches: no  Visual changes: no  RUQ pain: no  Edema: no    Objective:  /54  Wt 125 lb (56.7 kg)  LMP 2018  BMI 23.62 kg/m2  Fundal height: 21 cm  FHTs: 147  Edema: absent  Cervical exam: n/a    Assessment: 28 y.o.  at 22w2d by LMP c/w 9w3d sono here for routine OB visit.   Plan:  Routine prenatal care  Discussed breast feeding     Return to clinic in 3 week(s).

## 2021-06-18 NOTE — LETTER
Letter by Maria Antonia Duke MD at      Author: Maria Antonia Duke MD Service: -- Author Type: --    Filed:  Encounter Date: 2/21/2019 Status: (Other)        [place] [facility]  [address]  [facility] [address]  [phone]         Charlotte Clarke  [address] [place] [address]        02/21/19    Dear Charlotte Clarke,     At [facility] we care about your health and well-being. Your primary care provider is committed to ensuring you receive high quality care and has chosen a network of specialists to assist in providing that care. Recently Dr. Maria Antonia Duke referred you to [facility] for specialty care. They have made several attempts to connect with you to assist with scheduling, however they have been unable to reach you by phone.       It is important to your overall health to follow through with the recommendation from your provider. Please call [facility] ([phone]) at your earliest convenience for assistance in scheduling an appointment.  If you have already scheduled this appointment, please disregard this notice.  Thank you for choosing [facility] for your healthcare needs.       Sincerely,     Dr. Maria Antonia Duke /   [facility] Specialty Scheduling

## 2021-06-19 NOTE — PROGRESS NOTES
Patient is a 28 y.o.  at 32w2d here for routine OB visit.     Subjective:   Specific concerns: groin pain, feels like a pulled muscle    Morning sickness: no   Fatigue: yes  Heartburn: no  Cramping/contractions: no  Vaginal bleeding: no   Vaginal discharge: no  Leaking of fluid: no   Urinary symptoms: no  GI symptoms: no  Fetal movement: yes  Taking prenatal vitamins: yes  Headaches: no  Visual changes: no  RUQ pain: no  Edema: no    Objective:  /60  Wt 135 lb (61.2 kg)  LMP 2018  BMI 25.51 kg/m2  Fundal height: 31 cm  FHTs: 156 bpm  Edema: absent  Cervical exam: n/a    Assessment: 28 y.o.  at 32w2d by LMP c/w 9w3d sono here for routine OB visit.   Plan:  Routine prenatal care  Monitoring for specific conditions:   - Rhogam not indicated as maternal blood type is O+    Return to clinic in 2 week(s).

## 2021-06-19 NOTE — PROGRESS NOTES
Patient is a 28 y.o.  at 30w1d here for routine OB visit.     Subjective:   Specific concerns: CRAMPING, patient describes near daily early morning cramping.  Patient states she does not hydrate in the mornings, but normally has a Coke.  Patient describes the cramping is intermittently exceptionally severe, eliminating her ability to walk at some points.  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  She is often fatigued by the end of the shift.  The cramping is located midline lower abdomen and was accompanied by an increase in gassiness but no constipation or diarrhea per patient report.    Morning sickness: no   Fatigue: no  Heartburn: yes  Cramping/contractions: yes  Vaginal bleeding: no   Vaginal discharge: no  Leaking of fluid: no   Urinary symptoms: no  GI symptoms: no  Fetal movement: yes  Taking prenatal vitamins: yes  Headaches: no  Visual changes: no  RUQ pain: no  Edema: no    Objective:  BP 98/52  Wt 133 lb (60.3 kg)  LMP 2018  BMI 25.13 kg/m2  Fundal height: 30 cm  FHTs: 156  Edema: absent  Cervical exam: N/A    Assessment: 28 y.o.  at 30w1d by LMP c/w 9w3d sono here for routine OB visit.  Patient's description of her abdominal cramping is concerning for  contractions.  Had an extensive and lengthy conversation with patient and partner indicating that patient must rest and hydrate if she experiences these cramping again.  If it does not didi, patient must present to the hospital.  Hospital phone number provided and patient endorsed understanding.  No cervical check performed today as I do not suspect dilation given the short duration of these episodes.  Plan:  Routine prenatal care  Emphasized hydration and prompt response to any sensation of contractions    Monitoring for specific conditions:   - Rhogam not indicated as maternal blood type is O+    Routine labor/ROM/bleeding/decreased fetal movement  precautions given in AVS handout.    Return to clinic in 2 week(s).

## 2021-06-19 NOTE — PROGRESS NOTES
Patient is a 28 y.o.  at 28w2d here for routine OB visit.     Subjective:   Specific concerns: nausea and poor sleep    Morning sickness: no   Fatigue: no  Heartburn: yes  Cramping/contractions: yes; described as pressure in the suprapubic area  Vaginal bleeding: no   Vaginal discharge: no  Leaking of fluid: no   Urinary symptoms: no  GI symptoms: no  Fetal movement: yes  Taking prenatal vitamins: yes  Headaches: no  Visual changes: no  RUQ pain: no  Edema: no    Objective:  /60  Wt 134 lb (60.8 kg)  LMP 2018  BMI 25.32 kg/m2  Fundal height: 27 cm  FHTs: 156  Edema: trace  Cervical exam: not dilated, normal appearance of cervix    Assessment: 28 y.o.  at 28w2d by LMP c/w 9w3d sono here for routine OB visit.   Plan:  Routine prenatal care   Labs:  - Pap: pending  - cbc, syphilis, and glucose challenge  Tdap today    Monitoring for specific conditions:   - Rhogam not indicated as maternal blood type is O+    Routine labor/ROM/bleeding/decreased fetal movement precautions given in AVS handout.    Return to clinic in 2 week(s).

## 2021-06-19 NOTE — PROGRESS NOTES
Patient is a 28 y.o.  at 25w1d here for routine OB visit.     Subjective:   Specific concerns: Leg cramps    Morning sickness: no   Fatigue: no  Heartburn: no  Cramping/contractions: no  Vaginal bleeding: no   Vaginal discharge: no  Leaking of fluid: no   Urinary symptoms: no  GI symptoms: no  Fetal movement: yes  Taking prenatal vitamins: yes  Headaches: no  Visual changes: no  RUQ pain: no  Edema: no    Objective:  BP 96/52  Wt 127 lb (57.6 kg)  LMP 2018  BMI 24 kg/m2  Fundal height: 25 cm  FHTs: 144 bpm  Edema: absent  Cervical exam: n/a    Assessment: 28 y.o.  at 25w1d by LMP c/w 9w3d sono here for routine OB visit.   Plan:  Routine prenatal care  Pap next week  Muscle cramping seems to be related to the heat and exertion    Return to clinic in 3 week(s).

## 2021-06-20 ENCOUNTER — HEALTH MAINTENANCE LETTER (OUTPATIENT)
Age: 31
End: 2021-06-20

## 2021-06-20 NOTE — ANESTHESIA PROCEDURE NOTES
Epidural Block    Patient location during procedure: OB  Time Called: 10/6/2018 4:55 AM  Reason for Block:labor epidural  Preanesthetic Checklist  Completed: patient identified, risks, benefits, and alternatives discussed, timeout performed, consent obtained, airway assessed, oxygen available, suction available, emergency drugs available and hand hygiene performed  Procedure  Patient position: sitting  Prep: ChloraPrep  Patient monitoring: continuous pulse oximetry, heart rate and blood pressure  Approach: midline  Location: L2-L3  Injection technique: DEE saline  Number of Attempts:1  Needle  Needle type: Hustead     Catheter in Space: 3  Assessment  Sensory level:  No complications      Additional Notes:  Easily placed.

## 2021-06-20 NOTE — ANESTHESIA PREPROCEDURE EVALUATION
Anesthesia Evaluation      Patient summary reviewed   No history of anesthetic complications     Airway   Mallampati: II   Pulmonary - negative ROS                          Cardiovascular - negative ROS   Neuro/Psych - negative ROS     Endo/Other - negative ROS      GI/Hepatic/Renal - negative ROS           Dental                         Anesthesia Plan  Planned anesthetic: epidural    ASA 2     Anesthetic plan and risks discussed with: patient    Post-op plan: epidural analgesia

## 2021-06-20 NOTE — PROGRESS NOTES
Patient is a 28 y.o.  at 38w2d here for routine OB visit. Patient's OB course has thus far been complicated by nothing.     Subjective:   Specific concerns: none    Morning sickness: no   Fatigue: yes  Heartburn: no  Cramping/contractions: yes cramping  Vaginal bleeding: no   Vaginal discharge: no  Leaking of fluid: no   Urinary symptoms: no  GI symptoms: no  Fetal movement: yes  Taking prenatal vitamins: yes  Headaches: no  Visual changes: no  RUQ pain: no  Edema: fingers      Objective:  /72  Wt 150 lb (68 kg)  LMP 2018  BMI 28.34 kg/m2  Fundal height: n/a  FHTs: 156  Edema: absent  Cervical exam: n/a  Vertex position confirmed via ultrasound    Assessment: 28 y.o.  at 38w2d here for routine OB visit.   Plan:  Routine prenatal care  GBS -      Monitoring for specific conditions:   - Rhogam not indicated postpartum or at 28 week visit as maternal blood type is O+    Return to clinic in 1 week(s).

## 2021-06-20 NOTE — PROGRESS NOTES
Patient able to drink water with no more c/o nausea or diarrhea. She does have intermittent discomfort on her side but no cramping. Dr. Duke updated and discharge orders received.

## 2021-06-20 NOTE — PROGRESS NOTES
Patient is a 28 y.o.  at 34w2d here for routine OB visit. Patient's OB course has thus far been complicated by nothing.     Subjective:   Specific concerns: leg cramps    Morning sickness: no   Fatigue: no  Heartburn: yes  Cramping/contractions: no  Vaginal bleeding: no   Vaginal discharge: no  Leaking of fluid: no   Urinary symptoms: no  GI symptoms: no  Fetal movement: yes  Taking prenatal vitamins: yes  Headaches: no  Visual changes: no  RUQ pain: no  Edema: no    Objective:  /74  Wt 138 lb (62.6 kg)  LMP 2018  BMI 26.07 kg/m2  Fundal height: 32 cm  FHTs: 142  Edema: absent  Cervical exam: n/a    Assessment: 28 y.o.  at 34w2d here for routine OB visit.   Plan:  Routine prenatal care  Discussed today: work restrictions  Orders placed: US for small for gestational age      Monitoring for specific conditions:   - Rhogam not indicated postpartum or at 28 week visit as maternal blood type is O+  Routine labor/ROM/bleeding/decreased fetal movement precautions given in AVS handout.    Return to clinic in 2 week(s).

## 2021-06-20 NOTE — PROGRESS NOTES
Patient arrived to unit at 0340 accompanied by her significant other for  N/V, diarrhea and cramping. She stated that around midnight she begin vomiting and having diarrhea which lasted on/off for two hours. With that she had cramping which lasted till about 0245. VSS, no LOF, she states she last had intercourse yesterday. EFM applied, with a category 1 tracing, and occasional cramping. Dr. Robinson called and updated on patient's arrival, orders received to have patient drink water to see if able to tolerate and monitor strip for cramping/contractions.

## 2021-06-20 NOTE — PROGRESS NOTES
Patient is a 28 y.o.  at 37w2d here for routine OB visit. Patient's OB course has thus far been complicated by nothing.     Subjective:   Specific concerns: none    Morning sickness: no   Fatigue: no  Heartburn: yes  Cramping/contractions: no  Vaginal bleeding: no   Vaginal discharge: no  Leaking of fluid: no   Urinary symptoms: no  GI symptoms: no  Fetal movement: yes  Taking prenatal vitamins: yes  Headaches: no  Visual changes: no  RUQ pain: no  Edema: no    Objective:  BP 96/78  Wt 147 lb (66.7 kg)  LMP 2018  BMI 27.78 kg/m2  Fundal height: n/a  FHTs: 150  Edema: absent  Cervical exam: n/a    Assessment: 28 y.o.  at 37w2d here for routine OB visit.   Plan:  Routine prenatal care  GBS-  Discussed today: eating habits, recommend small frequent meals with a focus on healthy foods    Monitoring for specific conditions:   - Rhogam not indicated postpartum or at 28 week visit as maternal blood type is O+    Return to clinic in 1 week(s).

## 2021-06-20 NOTE — PROGRESS NOTES
Patient is a 28 y.o.  at 36w1d here for routine OB visit. Patient's OB course has thus far been complicated by nothing.     Subjective:   Specific concerns: lower back sharp pain    Morning sickness: yes some vomiting, infrequent  Fatigue: no  Heartburn: yes  Cramping/contractions: no  Vaginal bleeding: no   Vaginal discharge: no  Leaking of fluid: no   Urinary symptoms: no  GI symptoms: no  Fetal movement: yes  Taking prenatal vitamins: yes  Headaches: no  Visual changes: no  RUQ pain: no  Edema: yes fingers    Objective:  /62  LMP 2018  Fundal height: 35 cm  FHTs: 148  Edema: absent  Cervical exam: n/a    Assessment: 28 y.o.  at 36w1d here for routine OB visit.   Plan:  Routine prenatal care  Discussed today: influenza vaccine and safety of intercourse  Orders placed:   - influenza vaccine  - GBS 35-37 weeks ordered today        Monitoring for specific conditions:   - Rhogam not indicated postpartum or at 28 week visit as maternal blood type is O+    Return to clinic in 1 week(s).

## 2021-06-20 NOTE — PROGRESS NOTES
Assessment/Plan:     1.  Edema of legs, right more than left  Bryan with patient that this is most likely related to baby's positioning and impact on venous return.  However, in light of headaches, will assess further with ultrasound and also obtain conference of metabolic panel and hemogram to look for evidence of preeclampsia.  Will obtain right lower extremity ultrasound to rule out DVT given this acute and sudden change.  We will hold and call.  - Urinalysis-UC if Indicated  - Comprehensive Metabolic Panel  - HM2(CBC w/o Differential)  - US Venous Leg Right; Future      Subjective:      Charlotte Clarke is a 28 y.o. female presented to clinic today for evaluation of lower extremity edema.  She is currently 39 weeks pregnant with her first pregnancy, estimated due date of 10/10/2018.  2 days ago developed bilateral ankle edema, significantly worse on the right than the left, left edema just started today.  Denies calf pain.  No shortness of breath or chest pain.  Has had a headache for the last 2 days, has not had any other headaches during her pregnancy, notes this is a change for her.  Denies palpitations.  No new or different nausea.  Feeling fetal movement, reports that is more subtle than it had been previously but is present.    Current Outpatient Prescriptions   Medication Sig Dispense Refill     PRENATAL VIT37/IRON/FOLIC ACID (PRENATA ORAL) Take by mouth.       No current facility-administered medications for this visit.        Past Medical History, Family History, and Social History reviewed.  No past medical history on file.  No past surgical history on file.  Review of patient's allergies indicates no known allergies.  No family history on file.  Social History     Social History     Marital status: Single     Spouse name: N/A     Number of children: N/A     Years of education: N/A     Occupational History     Not on file.     Social History Main Topics     Smoking status: Former Smoker     Quit date:  "2/9/2018     Smokeless tobacco: Never Used     Alcohol use No     Drug use: No     Sexual activity: Yes     Other Topics Concern     Not on file     Social History Narrative         Review of systems is as stated in HPI, and the remainder of the 10 system review is otherwise negative.    Objective:     Vitals:    10/03/18 0850   BP: 122/82   Patient Site: Left Arm   Patient Position: Sitting   Cuff Size: Adult Regular   Pulse: 82   Resp: 20   Temp: 98.2  F (36.8  C)   TempSrc: Oral   SpO2: 98%   Weight: 154 lb 11.2 oz (70.2 kg)   Height: 5' 1\" (1.549 m)    Body mass index is 29.23 kg/(m^2).    Alert female in no acute distress.  Mucous membranes moist.  Heart with regular rate and rhythm.  Lungs clear.  Abdomen is gravid.  Extremities are with 1+ right and trace left edema, nonpitting.  No calf tenderness.  Fetal heart tones present at 120 bpm.      This note has been dictated using voice recognition software. Any grammatical or context distortions are unintentional and inherent to the the software.   "
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
## 2021-06-20 NOTE — ANESTHESIA POSTPROCEDURE EVALUATION
Patient: Charlotte Clarke    Anesthesia type: epidural    Patient location: Labor and Delivery  Last vitals:   Vitals:    10/06/18 0715   BP: 142/81   Pulse: 93   Resp:    Temp:    SpO2:      Post vital signs: stable  Level of consciousness: awake and responds to simple questions  Post-anesthesia pain: pain controlled  Post-anesthesia nausea and vomiting: no  Pulmonary: unassisted, return to baseline  Cardiovascular: stable and blood pressure at baseline  Hydration: adequate  Anesthetic events: no    QCDR Measures:  ASA# 11 - Brenda-op Cardiac Arrest: ASA11B - Patient did NOT experience unanticipated cardiac arrest  ASA# 12 - Brenda-op Mortality Rate: ASA12B - Patient did NOT die  ASA# 13 - PACU Re-Intubation Rate: NA - No ETT / LMA used for case  ASA# 10 - Composite Anes Safety: ASA10A - No serious adverse event    Additional Notes: doing well, LE worked great; was appreciative of Dr. Borden getting her through the procedure when she was anxious.

## 2021-06-21 NOTE — PROGRESS NOTES
POSTPARTUM OFFICE VISIT:    Charlotte Clarke is a 28 y.o. year old female here for 6 week postpartum check.  Patient was seen approximate 1 month ago for significant postpartum depression.  She started hydroxyzine to help with anxiety and we discussed her lack of appetite and the importance of a healthy diet.   She bled for 2 weeks after birth of baby and is not currently bleeding. She and her partner plan to use IUD for contraception. She has not gotten a period since birth of baby. They have not resumed intercourse since birth of baby.     Mood: good, [place] of 0  Appetite: poor  Energy: good  Breast: no concerns  Bowel movements: normal  Patient is not currently taking prenatal vitamins.     Patient denies problems with nausea, vomiting, diarrhea, constipation, black tarry stools or blood in her stools. Denies chest pain, shortness of breath, problems with urination or problems with intercourse. Remainder of review of systems is negative.     Baby is doing well with bottle feeding but there is some concern about loose stools and poor weight gain.      Current Outpatient Medications:      acetaminophen (TYLENOL) 325 MG tablet, Take 1-2 tablets (325-650 mg total) by mouth every 4 (four) hours as needed., Disp: , Rfl: 0     hydrOXYzine HCl (ATARAX) 25 MG tablet, Take 1 tablet (25 mg total) by mouth 3 (three) times a day as needed for anxiety., Disp: 90 tablet, Rfl: 0     ibuprofen (ADVIL,MOTRIN) 800 MG tablet, TAKE ONE TABLET BY MOUTH EVERY 8 HOURS, Disp: 90 tablet, Rfl: 0     PRENATAL VIT37/IRON/FOLIC ACID (PRENATA ORAL), Take by mouth., Disp: , Rfl:     No Known Allergies    Antepartum Complications: none  BERE: Estimated Date of Delivery: 10/10/18  Actual Date of Delivery: 10/6/18  Type of delivery: vavd  Episiotomy/laceration: none  Complications: THC use, otherwise none    OB History    Para Term  AB Living   2 1 1   1 1   SAB TAB Ectopic Multiple Live Births     1   0 1      # Outcome Date GA  Lbr Sukhdeep/2nd Weight Sex Delivery Anes PTL Lv   2 Term 10/06/18 39w3d 01:55 / 01:49 5 lb 12.1 oz (2.61 kg) F Vag-Vacuum EPI N CATHY      Complications: Fetal Intolerance   1 TAB                   Patient's last menstrual period was 2018.    No past medical history on file.    No past surgical history on file.    Social History     Socioeconomic History     Marital status: Single     Spouse name: Not on file     Number of children: Not on file     Years of education: Not on file     Highest education level: Not on file   Social Needs     Financial resource strain: Not on file     Food insecurity - worry: Not on file     Food insecurity - inability: Not on file     Transportation needs - medical: Not on file     Transportation needs - non-medical: Not on file   Occupational History     Not on file   Tobacco Use     Smoking status: Former Smoker     Last attempt to quit: 2018     Years since quittin.7     Smokeless tobacco: Never Used   Substance and Sexual Activity     Alcohol use: No     Drug use: No     Sexual activity: Yes   Other Topics Concern     Not on file   Social History Narrative     Not on file       Immunization History   Administered Date(s) Administered     DT (pediatric) 2004     Hep B, Adult 2004, 2005, 2005     Influenza,seasonal quad, PF, 36+MOS 2018     MMR 10/04/1993, 2004     Td, Adult, Absorbed 2004     Tdap 2012, 2018       PHYSICAL EXAM:  /74   Pulse 79   Ht 5' (1.524 m)   Wt 137 lb (62.1 kg)   LMP 2018   SpO2 100%   BMI 26.76 kg/m    Gen: Alert, NAD, appears stated age, normal hygiene   Psych: no apparent hallucinations or delusions, no pressured speech; alert, oriented x3      ASSESSMENT:   Healthy 28 y.o. old female here for 6 week postpartum exam, doing well.      PLAN:   She is overall doing well and has no concerns. They will use IUD for contraception. She will call with increasing problems or concerns.      Maria Antonia Duke MD

## 2021-06-21 NOTE — PROGRESS NOTES
Assessment/Plan:     Patient presents to clinic with symptoms consistent with baby blues or preliminary postpartum depression.  Patient is doing better than when she was seen in clinic 3 days ago at her  appointment.  She states her feelings of guilt have abated somewhat and that she has no thoughts of hurting herself or others.  She is reluctant to start any medication but was happy to consider an as needed medication of hydroxyzine.  For her sleep difficulties, and a handout was provided on sleep hygiene and patient was recommended to take melatonin but no other sleep aids.  For patient's lack of appetite, discussed healthy snacks, frequent snacking, and appropriate caloric intake for the day.  We discussed return precautions and patient will reach out if she needs additional help.  Patient is not using any marijuana at this time.    Problem List Items Addressed This Visit     None      Visit Diagnoses     Postpartum depression    -  Primary    Relevant Medications    hydrOXYzine HCl (ATARAX) 25 MG tablet          Return to clinic in 4 weeks for postpartum visit.     Total time spent with patient was 15 minutes with greater than 50% spent in face-to-face counseling regarding the above plan.    Subjective:      Charlotte Clarke is a 28 y.o. female who presents to clinic for mood discussion.  Patient delivered 1.5 weeks ago.  She was particularly anxious and tearful at her  appointment and was willing to return to clinic today for evaluation.  She states since that day she is felt much better.  She is occasionally consumed by feelings of guilt and of anxiety.  She has no thoughts of harming herself or others and is not hearing or seeing things other people cannot hear or see.  Her [place] score today is a 7.  She feels better than when she was seen 3 days ago.  She had used marijuana during her pregnancy and had significant guilt related to this.  Patient has trouble sleeping and eating.  She is  concerned about weight loss because she is not hungry.  She finds that she will watch TV until she falls asleep.      Past Medical History, Family History, and Social History reviewed.     Current Outpatient Prescriptions on File Prior to Visit   Medication Sig Dispense Refill     acetaminophen (TYLENOL) 325 MG tablet Take 1-2 tablets (325-650 mg total) by mouth every 4 (four) hours as needed.  0     ibuprofen (ADVIL,MOTRIN) 800 MG tablet Take 1 tablet (800 mg total) by mouth every 8 (eight) hours. 90 tablet 0     PRENATAL VIT37/IRON/FOLIC ACID (PRENATA ORAL) Take by mouth.       No current facility-administered medications on file prior to visit.        Review of systems is as stated in HPI.  The remainder of the 10 system review is otherwise negative.    Objective:     /72  Pulse 95  Wt 133 lb (60.3 kg)  LMP 01/03/2018  SpO2 98%  BMI 25.97 kg/m2 Body mass index is 25.97 kg/(m^2).    Gen: Alert, NAD, appears stated age, normal hygiene   Psych: no apparent hallucinations or delusions, no pressured speech; alert, oriented x3, occasionally nearly tearful but not officially tearful today, appeared appropriate and engaged in conversation and more happy than at previous appointment    This note has been dictated using voice recognition software. Any grammatical or context distortions are unintentional and inherent to the the software.     Maria Antonia Duke MD

## 2021-06-21 NOTE — PROGRESS NOTES
SUBJECTIVE:  Charlotte Clarke is a 28 y.o. female who presents to clinic for insertion of an IUD.  The alternative methods of contraception have been discussed with the patient and she would like to proceed with the procedure.      Patient's last menstrual period was 01/03/2018.    No Known Allergies    OBJECTIVE:  /79   Pulse 93   LMP 01/03/2018   SpO2 99%     Procedure:  The risks (including infection, bleeding, pain and uterine perforation) and benefits of the procedure were discussed with the patient and Written informed consent was obtained.  A urine pregnancy test was performed and confirmed negative.      The cervix was cleansed with betadine.  Uterus sounded to 8 cm.  The IUD was inserted without difficulty.  The string was visible and was trimmed.  The patient tolerated the procedure well without any complications.      IUD information:  MIrena  Lot # [id number], Expiration date Mar 2021    ASSESSMENT AND PLAN:  1. Encounter for insertion of intrauterine contraceptive device (IUD)  -Mirena IUD inserted without difficulty      The patient was advised to call for any fever or prolonged or severe pain or bleeding.  She was advised to use OTC ibuprofen as needed for mild to moderate pain.  She should return to clinic on an as needed basis.      Maria Antonia Duke MD

## 2021-06-22 NOTE — TELEPHONE ENCOUNTER
RN cannot approve Refill Request    RN can NOT refill this medication med is not covered by policy/route to provider. Last office visit: 2/7/2018 Ruy Fields MD Last Physical: Visit date not found Last MTM visit: Visit date not found Last visit same specialty: Visit date not found.  Next visit within 3 mo: Visit date not found  Next physical within 3 mo: Visit date not found      Amarilis Bess, [facility] Triage/Med Refill 1/2/2019    Requested Prescriptions   Pending Prescriptions Disp Refills     ibuprofen (ADVIL,MOTRIN) 800 MG tablet [Pharmacy Med Name: IBUPROFEN 800MG TABLETS] 90 tablet 0     Sig: TAKE 1 TABLET BY MOUTH EVERY 8 HOURS    There is no refill protocol information for this order

## 2021-06-23 NOTE — PROGRESS NOTES
Assessment/Plan:     Presents to clinic with 2 concerns.  The skin lesion originally appeared very similar to molluscum contagiosum, especially with the feeding to white appreciated on the other lesions, however the one lesion that looked most similar to molluscum has apparently been present for 3 years.  With the 's endorsement of the changing color, molluscum still seems possible, but it is possible the patient is also having some scar formation from some acne versus another etiology.  If it bothers patient, in the future, we could send to dermatology.    Patient also presents with left-sided upper back strain.  Will treat with a short course of Flexeril but recommended that patient attend at least one physical therapy session as I suspect something in patient's work is exacerbating this discomfort, and this is likely to persist unless patient addresses it with stretching and exercises.    Problem List Items Addressed This Visit     None      Visit Diagnoses     Back strain, initial encounter    -  Primary    Relevant Orders    Ambulatory referral to PT/OT    Molluscum contagiosum              Return to clinic in 6 months for physical.     Total time spent with patient was 20 minutes with greater than 50% spent in face-to-face counseling regarding the above plan.    Subjective:      Charlotte Clarke is a 28 y.o. female who presents to clinic for lateral side pain.  Patient endorses a pain that starts just left of the spinous process in the thoracic spinal area that radiates directly the left/lateral and extends up to the scapula and into the neck.  It worsens now that she is back at work.  She is not breast-feeding and does not feel it is related to feeding her baby.  She does state that her job is very physical and as it is worse at the end of the day she believes it is likely contributory.  She has not expressed pain like this prior to 1.5 months ago.  But the pain is worsening.      In addition,  patient has noticed some lesions on her back.  She has been told previously it was a mole, but sometimes they fade into white dots.  When the  was queried, he states that occasionally they start out pink and then fade into white.  Patient states that they sometimes are pruritic.    Past Medical History, Family History, and Social History reviewed.     Current Outpatient Medications on File Prior to Visit   Medication Sig Dispense Refill     acetaminophen (TYLENOL) 325 MG tablet Take 1-2 tablets (325-650 mg total) by mouth every 4 (four) hours as needed.  0     hydrOXYzine HCl (ATARAX) 25 MG tablet Take 1 tablet (25 mg total) by mouth 3 (three) times a day as needed for anxiety. 90 tablet 0     ibuprofen (ADVIL,MOTRIN) 800 MG tablet TAKE 1 TABLET BY MOUTH EVERY 8 HOURS 90 tablet 0     PRENATAL VIT37/IRON/FOLIC ACID (PRENATA ORAL) Take by mouth.       No current facility-administered medications on file prior to visit.        Review of systems is as stated in HPI.  The remainder of the 10 system review is otherwise negative.    Objective:     /68   Pulse 98   Ht 5' (1.524 m)   Wt 125 lb (56.7 kg)   SpO2 98%   BMI 24.41 kg/m   Body mass index is 24.41 kg/m .    Gen: Alert, NAD, appears stated age, normal hygiene   MSK: grossly full range of motion in all joints, no obvious deformity; tenderness to palpation in the entire left upper back, extending beneath the shoulder blade and up into the neck, no obvious deformity  SKIN: 1 fleshy umbilicated papule appreciated to the left of the spinal column in the mid thoracic area, several much smaller white macules appreciated scattered across patient's upper back, some appear umbilicated      This note has been dictated using voice recognition software. Any grammatical or context distortions are unintentional and inherent to the the software.     Maria Antonia Duke MD

## 2021-06-23 NOTE — TELEPHONE ENCOUNTER
"Call from pt     Has back pain that that has been going on for about 45 days  Or so      Localized to the back - non-radiating     Does not recall a specific event that triggered this     Twisting to the R is worse than to the L     Consistent sense of pain right on the spine     No numbness / tingling elsewhere (hands / feet)     Can be \"sharp \" pain at times but then resolves into a dully achy pain with some time     IBU not sofi helpful - Hot showers not always helpful     Back massage provides temporary relief       A/P   > Warm moist compresses throughout the  day    >Continue with other at home care as before / as tolerated  Per care advice         Joey Vogt, RN   Triage and Medication Refills        Reason for Disposition    Back pain persists > 2 weeks    Protocols used: BACK PAIN-A-[place]      "

## 2021-06-24 NOTE — TELEPHONE ENCOUNTER
Orders being requested: Physical Therapy  Reason service is needed/diagnosis: Back Pain  When are orders needed by: As Able  Where to send Orders: Please fax orders to [facility] at: [phone]  Okay to leave detailed message?  Yes

## 2021-07-14 PROBLEM — Z34.90 PREGNANT: Status: RESOLVED | Noted: 2018-10-05 | Resolved: 2019-01-28

## 2021-09-28 ENCOUNTER — OFFICE VISIT (OUTPATIENT)
Dept: FAMILY MEDICINE | Facility: CLINIC | Age: 31
End: 2021-09-28
Payer: COMMERCIAL

## 2021-09-28 VITALS — OXYGEN SATURATION: 99 % | HEART RATE: 95 BPM | SYSTOLIC BLOOD PRESSURE: 102 MMHG | DIASTOLIC BLOOD PRESSURE: 70 MMHG

## 2021-09-28 DIAGNOSIS — Z30.432 ENCOUNTER FOR IUD REMOVAL: Primary | ICD-10-CM

## 2021-09-28 PROCEDURE — 58301 REMOVE INTRAUTERINE DEVICE: CPT | Performed by: FAMILY MEDICINE

## 2021-09-30 NOTE — PROGRESS NOTES
IUD removed in the normal fashion without any difficulty.  Patient was encouraged to take prenatal vitamins.

## 2021-10-11 ENCOUNTER — HEALTH MAINTENANCE LETTER (OUTPATIENT)
Age: 31
End: 2021-10-11

## 2021-12-27 PROBLEM — R31.9 ESSENTIAL HEMATURIA: Status: ACTIVE | Noted: 2021-12-27

## 2022-03-04 ENCOUNTER — OFFICE VISIT (OUTPATIENT)
Dept: FAMILY MEDICINE | Facility: CLINIC | Age: 32
End: 2022-03-04
Payer: COMMERCIAL

## 2022-03-04 VITALS
WEIGHT: 115.5 LBS | BODY MASS INDEX: 22.56 KG/M2 | RESPIRATION RATE: 16 BRPM | HEART RATE: 67 BPM | DIASTOLIC BLOOD PRESSURE: 65 MMHG | SYSTOLIC BLOOD PRESSURE: 110 MMHG

## 2022-03-04 DIAGNOSIS — Z12.4 CERVICAL CANCER SCREENING: ICD-10-CM

## 2022-03-04 DIAGNOSIS — Z97.5 IUD (INTRAUTERINE DEVICE) IN PLACE: ICD-10-CM

## 2022-03-04 DIAGNOSIS — Z31.9 INFERTILITY MANAGEMENT: Primary | ICD-10-CM

## 2022-03-04 DIAGNOSIS — N92.6 IRREGULAR PERIODS: ICD-10-CM

## 2022-03-04 DIAGNOSIS — Z11.59 NEED FOR HEPATITIS C SCREENING TEST: ICD-10-CM

## 2022-03-04 LAB — TSH SERPL DL<=0.005 MIU/L-ACNC: 1.49 UIU/ML (ref 0.3–5)

## 2022-03-04 PROCEDURE — 36415 COLL VENOUS BLD VENIPUNCTURE: CPT | Performed by: FAMILY MEDICINE

## 2022-03-04 PROCEDURE — 84443 ASSAY THYROID STIM HORMONE: CPT | Performed by: FAMILY MEDICINE

## 2022-03-04 PROCEDURE — 99214 OFFICE O/P EST MOD 30 MIN: CPT | Performed by: FAMILY MEDICINE

## 2022-03-04 ASSESSMENT — ENCOUNTER SYMPTOMS
DYSURIA: 0
FEVER: 0
SORE THROAT: 0
PALPITATIONS: 0
SHORTNESS OF BREATH: 0
HEARTBURN: 0
BREAST MASS: 0
MYALGIAS: 0
CONSTIPATION: 0
NAUSEA: 0
ABDOMINAL PAIN: 0
HEMATURIA: 0
COUGH: 0
WEAKNESS: 0
ARTHRALGIAS: 0
FREQUENCY: 0
DIZZINESS: 0
HEADACHES: 0
PARESTHESIAS: 0
HEMATOCHEZIA: 0
DIARRHEA: 0
EYE PAIN: 0
CHILLS: 0
NERVOUS/ANXIOUS: 0
JOINT SWELLING: 0

## 2022-03-04 NOTE — PROGRESS NOTES
Assessment & Plan   Problem List Items Addressed This Visit        Urinary    RESOLVED: IUD (intrauterine device) in place     She says this is out now.             Other    Infertility management - Primary     She complains of irregular periods, vaginal dryness despite 30 or more minutes of foreplay, and difficult getting pregnant with her 3 year old saying it took 3 years. She is now concerned that she has been trying to get pregnant for nearly 6 months.     Reproductive medicine consult ordered.          Relevant Orders    TSH    Infertility/AI Referral      Other Visit Diagnoses     Need for hepatitis C screening test        Cervical cancer screening        Irregular periods                Tobacco Cessation:   reports that she has been smoking. She has a 2.00 pack-year smoking history. She has never used smokeless tobacco.      No follow-ups on file.    Katelynn Tavarez MD  [facility] KEZIA Porter is a 31 year old who presents for the following health issues     She thinks that even with 30 minutes of foreplay she is having a lot of vaginal dryness.     She is concerned about infertility, reproductive endocrinologist discussed she declined for how she is not sure her significant is interested.     History of Present Illness     Reason for visit:  Body change and pregnancy  Symptom onset:  More than a month  Symptoms include:  Dryness  Symptom intensity:  Mild  Symptom progression:  Worsening  Had these symptoms before:  No  What makes it worse:  No  What makes it better:  No    She eats 2-3 servings of fruits and vegetables daily.She consumes 3 sweetened beverage(s) daily.She exercises with enough effort to increase her heart rate 60 or more minutes per day.  She exercises with enough effort to increase her heart rate 5 days per week.   She is taking medications regularly.       Vaginal Symptoms  Onset/Duration:  September 2021  Description:  Vaginal Discharge: none   Itching  (Pruritis): no  Burning sensation:  no  Odor: no  Accompanying Signs & Symptoms:  Urinary symptoms: no  Abdominal pain: YES-some pelvic pain at times, infrequent per patient   Fever: no  History:   Sexually active: YES  New Partner: no  Possibility of Pregnancy:  no  Recent antibiotic use: no  Previous vaginitis issues: no  Precipitating or alleviating factors: None  Therapies tried and outcome: none and lubricant during intercourse         Review of Systems   Constitutional: Negative for chills and fever.   HENT: Negative for congestion, ear pain, hearing loss and sore throat.    Eyes: Negative for pain and visual disturbance.   Respiratory: Negative for cough and shortness of breath.    Cardiovascular: Negative for chest pain, palpitations and peripheral edema.   Gastrointestinal: Negative for abdominal pain, constipation, diarrhea, heartburn, hematochezia and nausea.   Breasts:  Negative for tenderness, breast mass and discharge.   Genitourinary: Negative for dysuria, frequency, genital sores, hematuria, pelvic pain, urgency, vaginal bleeding and vaginal discharge.   Musculoskeletal: Negative for arthralgias, joint swelling and myalgias.   Skin: Negative for rash.   Neurological: Negative for dizziness, weakness, headaches and paresthesias.   Psychiatric/Behavioral: Negative for mood changes. The patient is not nervous/anxious.              Objective    /65 (BP Location: Left arm, Patient Position: Sitting, Cuff Size: Adult Small)   Pulse 67   Resp 16   Wt 52.4 kg (115 lb 8 oz)   LMP 02/28/2022 (Exact Date)   Breastfeeding No   BMI 22.56 kg/m    Body mass index is 22.56 kg/m .  Physical Exam  Constitutional:       Appearance: Normal appearance.   HENT:      Head: Normocephalic and atraumatic.   Cardiovascular:      Rate and Rhythm: Normal rate and regular rhythm.   Pulmonary:      Effort: Pulmonary effort is normal.   Genitourinary:     Comments: Pap due but patient declined exam.  Musculoskeletal:          General: Normal range of motion.      Cervical back: Normal range of motion and neck supple.   Neurological:      General: No focal deficit present.      Mental Status: She is alert and oriented to person, place, and time.

## 2022-03-05 NOTE — ASSESSMENT & PLAN NOTE
She complains of irregular periods, vaginal dryness despite 30 or more minutes of foreplay, and difficult getting pregnant with her 3 year old saying it took 3 years. She is now concerned that she has been trying to get pregnant for nearly 6 months.     Reproductive medicine consult ordered.

## 2022-05-10 NOTE — PROGRESS NOTES
Confirmation of Pregnancy visit    Assessment:     , LMP 3/28/22, making her 6w2d gest today  EDC 23  Regular cycles since IUD removed in 2021.      Plan:   1. Discussed working Estimated Date of Delivery:  2023 May get US to confirm dating--Will check with insurance.  2. Oriented to HE CN care; group and practice info reviewed.   3. 1st trimester teaching: encouraged well-balanced diet, pre-magdalene vitamins, vitamin D3 and omega 3 supplements, Calcium, iron. Walking for exercise. Discussed warning signs and when to call.   4. She will schedule her initial OB visit at 10-12 weeks gestation.   5. Wants help with breastfeeding this time    Total time spent with patient 30 minutes, >50% counseling, education and coordination of care.   Subjective:     Charlotte is a 31 year old y.o.  who presents for pregnancy confirmation. pregnancy is planned/desired.  Contraception: IUD Mirena--removed in 2021.      Current symptoms also include: breast tenderness, fatigue, frequent urination, morning sickness, nausea and positive home pregnancy test. UPT pos 22.     LMP 3/28/22. She is certain of this date. Menstrual cycles are regular, occuring every 28-30 days lasting 5-6 days.  Last period was normal.      Review of Systems  Denies bleeding, pain or cramping, abnormal vaginal discharge or dysuria.  Objective:     There were no vitals taken for this visit.   General: alert and no acute distress      Lab Review  Urine HCG: positive

## 2022-05-11 ENCOUNTER — OFFICE VISIT (OUTPATIENT)
Dept: MIDWIFE SERVICES | Facility: CLINIC | Age: 32
End: 2022-05-11
Payer: COMMERCIAL

## 2022-05-11 VITALS
BODY MASS INDEX: 22.78 KG/M2 | HEART RATE: 84 BPM | HEIGHT: 60 IN | WEIGHT: 116 LBS | DIASTOLIC BLOOD PRESSURE: 52 MMHG | SYSTOLIC BLOOD PRESSURE: 118 MMHG

## 2022-05-11 DIAGNOSIS — N92.6 MISSED MENSES: Primary | ICD-10-CM

## 2022-05-11 PROBLEM — Z86.59 HISTORY OF POSTPARTUM DEPRESSION: Status: ACTIVE | Noted: 2022-05-11

## 2022-05-11 PROBLEM — Z87.59 HISTORY OF POSTPARTUM DEPRESSION: Status: ACTIVE | Noted: 2022-05-11

## 2022-05-11 PROBLEM — Z31.9 INFERTILITY MANAGEMENT: Status: RESOLVED | Noted: 2022-03-04 | Resolved: 2022-05-11

## 2022-05-11 PROBLEM — R31.9 ESSENTIAL HEMATURIA: Status: RESOLVED | Noted: 2021-12-27 | Resolved: 2022-05-11

## 2022-05-11 LAB — HCG UR QL: POSITIVE

## 2022-05-11 PROCEDURE — 81025 URINE PREGNANCY TEST: CPT | Performed by: MIDWIFE

## 2022-05-11 PROCEDURE — 99203 OFFICE O/P NEW LOW 30 MIN: CPT | Performed by: MIDWIFE

## 2022-06-21 NOTE — PROGRESS NOTES
"PRENATAL VISIT   FIRST OBSTETRICAL EXAM - OB     Assessment / Impression   First prenatal visit at 12w2d  33yo    Last pap = 2018--due  BMI 22  EDPS = 0, \"never\" to #10    Plan:     -IOB labs drawn.   -Pt is not a candidate for drawing lead level per [facility] screening tool.   -Patient is not interested in waterbirth.    -Reviewed prenatal care schedule.   -Optimal nutrition and weight gain discussed. Pregnancy weight gain of 25-35 lbs (BMI 18.5-24.9) encouraged.   -Anticipatory guidance for common pregnancy questions and concerns reviewed.   -Danger s/sx for this trimester reviewed with patient.   -Reviewed genetic screening options with patient, patient does not elect for first trimester screening. The patient does not elect for quad screening.   -Reviewed carrier testing options with patient, patient does not elect for testing or referral to genetic counseling.  -desires only 20 wk US  -IOB packet given and reviewed with patient.   -CNM services and hospital options reviewed; emergency and scheduling phone numbers given to patient.   -Because the patient does not have 1 high risk nor 2 or more moderate risk factors, low-dose aspirin not be initiated.   -Antepartum VTE risk factors present- \"At high risk for VTE\" added to problem list.  -Patient is not at increased risk for overt diabetes, so A1C will not be added to IOB labs today.  -Pt is not a candidate for an antepartum OB consult.    -Return to clinic 2-4 wks. NV needs PE, PAP, GC/Chlamydia    Total time: 45 minutes spent on the date of the encounter doing chart review, review of test results, patient visit and documentation.     Subjective:   Charlotte Clarke is a 32 year old  here today for her first obstetrical exam at 12w2d. Here by herself. This pregnancy is planned. IUD removed 2021. Attempting pregnancy for 8 months. The patient reports nausea, but no vomiting, fatigue and some mild breast tenderness. She has a certain LMP 3/38/22, " "predicting an expected date of delivery of Estimated Date of Delivery: 23. Last period was normal. Her previous three cycles were normal. Her pregnancy is dated by sure LMP 3/28/22.     The patient states that she is in a monogamous relationship and states that she is safe. Offered GC/CT screening and patient accepts for NV    Risk factors: none Pregnancy Risk Factors: hx PPD, hx of VAVD    The patient has the following high risk factors for preeclampsia:none. The patient has the following moderate risk factors for preeclampsia:none.     The patient has the following antepartum risk factors for VTE (two or more risk factors, or 1 * risk factor, places patient at higher risk): none.   Clinical history/risk factors requiring antepartum OB consult: none    The patient has the following risk factors for overt diabetes: Not at increased risk, BMI normal (or under 23 for  Americans). Plus the additional risk factor(s) of: Other clinical condition associated with insulin resistance (eg, severe obesity, acanthosis nigricans), Previous birth of an infant weighing greater than or equal to 4000 g, Older age (40 years +) and No additional risk factors.    Social History:   Education level: HS   Occupation:    Partners name: partner Igrish   ?   OB History    Para Term  AB Living   2 1 1 0 1 1   SAB IAB Ectopic Multiple Live Births   0 1 0 0 1      # Outcome Date GA Lbr Sukhdeep/2nd Weight Sex Delivery Anes PTL Lv   2 Term 10/06/18 39w3d 01:55 / 01:49 2.61 kg (5 lb 12.1 oz) F Vag-Vacuum EPI N CATHY      Birth Comments: IOL, cytotec, emesis, epdural, pushed 1 hr, VAVD, no lac, Bottlefed      Complications: Fetal Intolerance      Name: \"Melany\" daughter      Apgar1: 8  Apgar5: 9   1 IAB                 History:   Past Medical History:   Diagnosis Date     Depression      IUD (intrauterine device) in place 2020    Mirena IUD placed 2020      Postpartum depression       No past surgical history on " "file.   Family History   Problem Relation Age of Onset     Colon Cancer Maternal Grandmother      Diabetes Maternal Grandmother      Hypertension Maternal Grandmother      Cancer Paternal Grandmother 60        Unknown type     Heart Disease Paternal Grandfather 60        Patient states \"they said he  of a broken heart\"     Colon Cancer Paternal Uncle 50      Social History     Tobacco Use     Smoking status: Former Smoker     Packs/day: 0.20     Years: 10.00     Pack years: 2.00     Quit date: 2022     Years since quittin.1     Smokeless tobacco: Never Used   Substance Use Topics     Alcohol use: No     Drug use: No      No current outpatient medications on file.      No Known Allergies     The patient's medical, surgical and family histories were reviewed    Pap smear: Last Pap: 2018, Result: NILM, due for pap      EPDS score today: 0.\"never\" to #10   History of anxiety or depression: yes    Review of Systems   General: Fatigue but otherwise denies problem   Eyes: Denies problem   Ears/Nose/Throat: Denies problem   Cardiovascular: Denies problem   Respiratory: Denies problem   Gastrointestinal: Nausea without vomiting, otherwise negative   Genitourinary: Denies any discharge, vaginal bleeding or itchiness or any other problem   Musculoskeletal: Breast tenderness otherwise denies problem   Skin: Denies problem   Neurologic: Denies problem   Psychiatric: Denies problem   Endocrine: Denies problem   Heme/Lymphatic: Denies problem   Allergic/Immunologic: Denies problem         Objective:   Objective  There were no vitals filed for this visit.     Physical Exam:   General Appearance: Alert, cooperative, no distress, appears stated age   HEENT: Normocephalic, without obvious abnormality, atraumatic. Conjunctiva/corneas clear  Lungs:  respirations unlabored   Heart: perfused normally  Breasts: deferred  Abdomen: Gravid, soft, non-tender,no masses.   FHT: 158   Vulva: deferred  Vagina: deferred  Cervix: " deferred  Uterus: deferred  Adnexa: deferred  Musculoskeletal: Extremities normal, atraumatic, no cyanosis   Skin: Skin color, texture, turgor normal, no rashes or lesions   Neurologic: Alert and oriented x 3. Normal speech   Lab: No results found for any visits on 06/22/22.

## 2022-06-22 ENCOUNTER — PRENATAL OFFICE VISIT (OUTPATIENT)
Dept: MIDWIFE SERVICES | Facility: CLINIC | Age: 32
End: 2022-06-22
Payer: COMMERCIAL

## 2022-06-22 VITALS
HEIGHT: 60 IN | SYSTOLIC BLOOD PRESSURE: 116 MMHG | HEART RATE: 72 BPM | DIASTOLIC BLOOD PRESSURE: 72 MMHG | WEIGHT: 123 LBS | BODY MASS INDEX: 24.15 KG/M2

## 2022-06-22 DIAGNOSIS — Z34.80 ENCOUNTER FOR SUPERVISION OF OTHER NORMAL PREGNANCY, UNSPECIFIED TRIMESTER: ICD-10-CM

## 2022-06-22 DIAGNOSIS — Z34.90 ENCOUNTER FOR SUPERVISION OF NORMAL PREGNANCY, ANTEPARTUM, UNSPECIFIED GRAVIDITY: Primary | ICD-10-CM

## 2022-06-22 LAB
ABO/RH(D): NORMAL
ANTIBODY SCREEN: NEGATIVE
ERYTHROCYTE [DISTWIDTH] IN BLOOD BY AUTOMATED COUNT: 12.5 % (ref 10–15)
HCT VFR BLD AUTO: 39.7 % (ref 35–47)
HGB BLD-MCNC: 13.7 G/DL (ref 11.7–15.7)
MCH RBC QN AUTO: 30.6 PG (ref 26.5–33)
MCHC RBC AUTO-ENTMCNC: 34.5 G/DL (ref 31.5–36.5)
MCV RBC AUTO: 89 FL (ref 78–100)
PLATELET # BLD AUTO: 206 10E3/UL (ref 150–450)
RBC # BLD AUTO: 4.48 10E6/UL (ref 3.8–5.2)
SPECIMEN EXPIRATION DATE: NORMAL
SPECIMEN EXPIRATION DATE: NORMAL
T PALLIDUM AB SER QL: NONREACTIVE
WBC # BLD AUTO: 8 10E3/UL (ref 4–11)

## 2022-06-22 PROCEDURE — 36415 COLL VENOUS BLD VENIPUNCTURE: CPT | Performed by: MIDWIFE

## 2022-06-22 PROCEDURE — 99207 PR FIRST OB VISIT: CPT | Performed by: MIDWIFE

## 2022-06-22 PROCEDURE — 85027 COMPLETE CBC AUTOMATED: CPT | Performed by: MIDWIFE

## 2022-06-22 PROCEDURE — 86901 BLOOD TYPING SEROLOGIC RH(D): CPT | Performed by: MIDWIFE

## 2022-06-22 PROCEDURE — 99215 OFFICE O/P EST HI 40 MIN: CPT | Performed by: MIDWIFE

## 2022-06-22 PROCEDURE — 87389 HIV-1 AG W/HIV-1&-2 AB AG IA: CPT | Performed by: MIDWIFE

## 2022-06-22 PROCEDURE — 87340 HEPATITIS B SURFACE AG IA: CPT | Performed by: MIDWIFE

## 2022-06-22 PROCEDURE — 86850 RBC ANTIBODY SCREEN: CPT | Performed by: MIDWIFE

## 2022-06-22 PROCEDURE — 86762 RUBELLA ANTIBODY: CPT | Performed by: MIDWIFE

## 2022-06-22 PROCEDURE — 86780 TREPONEMA PALLIDUM: CPT | Performed by: MIDWIFE

## 2022-06-22 PROCEDURE — 86803 HEPATITIS C AB TEST: CPT | Performed by: MIDWIFE

## 2022-06-22 PROCEDURE — 82306 VITAMIN D 25 HYDROXY: CPT | Performed by: MIDWIFE

## 2022-06-22 PROCEDURE — 86900 BLOOD TYPING SEROLOGIC ABO: CPT | Performed by: MIDWIFE

## 2022-06-22 PROCEDURE — 87086 URINE CULTURE/COLONY COUNT: CPT | Performed by: MIDWIFE

## 2022-06-22 RX ORDER — PRENATAL VIT/IRON FUM/FOLIC AC 27MG-0.8MG
1 TABLET ORAL DAILY
COMMUNITY
End: 2022-10-07

## 2022-06-23 LAB
DEPRECATED CALCIDIOL+CALCIFEROL SERPL-MC: 30 UG/L (ref 20–75)
HBV SURFACE AG SERPL QL IA: NONREACTIVE
HCV AB SERPL QL IA: NEGATIVE
HIV 1+2 AB+HIV1 P24 AG SERPL QL IA: NEGATIVE
RUBV IGG SERPL QL IA: 29.5 INDEX
RUBV IGG SERPL QL IA: POSITIVE

## 2022-06-24 LAB — BACTERIA UR CULT: NORMAL

## 2022-07-17 ENCOUNTER — HEALTH MAINTENANCE LETTER (OUTPATIENT)
Age: 32
End: 2022-07-17

## 2022-08-12 ENCOUNTER — HOSPITAL ENCOUNTER (OUTPATIENT)
Dept: ULTRASOUND IMAGING | Facility: HOSPITAL | Age: 32
Discharge: HOME | End: 2022-08-12
Attending: ADVANCED PRACTICE MIDWIFE | Admitting: ADVANCED PRACTICE MIDWIFE
Payer: COMMERCIAL

## 2022-08-12 ENCOUNTER — PRENATAL OFFICE VISIT (OUTPATIENT)
Dept: MIDWIFE SERVICES | Facility: CLINIC | Age: 32
End: 2022-08-12
Payer: COMMERCIAL

## 2022-08-12 VITALS
DIASTOLIC BLOOD PRESSURE: 62 MMHG | BODY MASS INDEX: 26.56 KG/M2 | HEART RATE: 72 BPM | SYSTOLIC BLOOD PRESSURE: 108 MMHG | WEIGHT: 136 LBS

## 2022-08-12 DIAGNOSIS — Z87.59 HISTORY OF POSTPARTUM DEPRESSION: ICD-10-CM

## 2022-08-12 DIAGNOSIS — Z11.3 ROUTINE SCREENING FOR STI (SEXUALLY TRANSMITTED INFECTION): ICD-10-CM

## 2022-08-12 DIAGNOSIS — O26.02 EXCESSIVE WEIGHT GAIN DURING PREGNANCY IN SECOND TRIMESTER: ICD-10-CM

## 2022-08-12 DIAGNOSIS — Z34.80 ENCOUNTER FOR SUPERVISION OF OTHER NORMAL PREGNANCY, UNSPECIFIED TRIMESTER: Primary | ICD-10-CM

## 2022-08-12 DIAGNOSIS — Z86.59 HISTORY OF POSTPARTUM DEPRESSION: ICD-10-CM

## 2022-08-12 DIAGNOSIS — Z34.80 ENCOUNTER FOR SUPERVISION OF OTHER NORMAL PREGNANCY, UNSPECIFIED TRIMESTER: ICD-10-CM

## 2022-08-12 DIAGNOSIS — Z12.4 CERVICAL CANCER SCREENING: ICD-10-CM

## 2022-08-12 PROCEDURE — 87491 CHLMYD TRACH DNA AMP PROBE: CPT | Performed by: ADVANCED PRACTICE MIDWIFE

## 2022-08-12 PROCEDURE — 76805 OB US >/= 14 WKS SNGL FETUS: CPT

## 2022-08-12 PROCEDURE — G0145 SCR C/V CYTO,THINLAYER,RESCR: HCPCS | Performed by: ADVANCED PRACTICE MIDWIFE

## 2022-08-12 PROCEDURE — 87591 N.GONORRHOEAE DNA AMP PROB: CPT | Performed by: ADVANCED PRACTICE MIDWIFE

## 2022-08-12 PROCEDURE — 99207 PR PRENATAL VISIT: CPT | Performed by: ADVANCED PRACTICE MIDWIFE

## 2022-08-12 NOTE — PROGRESS NOTES
"Charlotte presents by herself.  She is willing to undergo PE, pap and GC/CT today--completed without incident.  Initial OB lab results reviewed in their entirety, WNL.  20-week fetal anatomy ultrasound ordered.  Charlotte would like to breast-feed this time around, and she felt under supported after giving birth the first time.  This writer refers her to Maritza Rosario.  Total weight gain thus far is excessive at 23 pounds with pregravid BMI 22.  Patient states she gained 55 pounds in her first pregnancy.  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  Encouraged to walk outside of work most days of the week.  \"I am hungry all the time!\"  Charlotte continues to DECLINE genetic screening.  Second trimester teaching completed.  Danger signs and symptoms reviewed.  All questions answered.  Encouraged to call or RTC with any questions, concerns, or as needed.  "

## 2022-08-13 LAB
C TRACH DNA SPEC QL PROBE+SIG AMP: NEGATIVE
N GONORRHOEA DNA SPEC QL NAA+PROBE: NEGATIVE

## 2022-08-15 ENCOUNTER — DOCUMENTATION ONLY (OUTPATIENT)
Dept: MIDWIFE SERVICES | Facility: CLINIC | Age: 32
End: 2022-08-15

## 2022-08-18 LAB
BKR LAB AP GYN ADEQUACY: NORMAL
BKR LAB AP GYN INTERPRETATION: NORMAL
BKR LAB AP HPV REFLEX: NORMAL
BKR LAB AP PREVIOUS ABNORMAL: NORMAL
PATH REPORT.COMMENTS IMP SPEC: NORMAL
PATH REPORT.COMMENTS IMP SPEC: NORMAL
PATH REPORT.RELEVANT HX SPEC: NORMAL

## 2022-08-25 ENCOUNTER — HOSPITAL ENCOUNTER (EMERGENCY)
Facility: HOSPITAL | Age: 32
Discharge: HOME | End: 2022-08-25
Attending: STUDENT IN AN ORGANIZED HEALTH CARE EDUCATION/TRAINING PROGRAM | Admitting: STUDENT IN AN ORGANIZED HEALTH CARE EDUCATION/TRAINING PROGRAM
Payer: COMMERCIAL

## 2022-08-25 VITALS
HEART RATE: 100 BPM | BODY MASS INDEX: 25.91 KG/M2 | RESPIRATION RATE: 16 BRPM | WEIGHT: 132 LBS | OXYGEN SATURATION: 97 % | SYSTOLIC BLOOD PRESSURE: 99 MMHG | HEIGHT: 60 IN | TEMPERATURE: 98.4 F | DIASTOLIC BLOOD PRESSURE: 57 MMHG

## 2022-08-25 DIAGNOSIS — R52 BODY ACHES: ICD-10-CM

## 2022-08-25 DIAGNOSIS — R11.2 NON-INTRACTABLE VOMITING WITH NAUSEA, UNSPECIFIED VOMITING TYPE: ICD-10-CM

## 2022-08-25 DIAGNOSIS — U07.1 COVID-19: ICD-10-CM

## 2022-08-25 LAB
ALBUMIN SERPL-MCNC: 3.2 G/DL (ref 3.5–5)
ALP SERPL-CCNC: 80 U/L (ref 45–120)
ALT SERPL W P-5'-P-CCNC: 14 U/L (ref 0–45)
ANION GAP SERPL CALCULATED.3IONS-SCNC: 9 MMOL/L (ref 5–18)
AST SERPL W P-5'-P-CCNC: 19 U/L (ref 0–40)
BASOPHILS # BLD AUTO: 0 10E3/UL (ref 0–0.2)
BASOPHILS NFR BLD AUTO: 0 %
BILIRUB DIRECT SERPL-MCNC: 0.1 MG/DL
BILIRUB SERPL-MCNC: 0.4 MG/DL (ref 0–1)
BUN SERPL-MCNC: 5 MG/DL (ref 8–22)
C REACTIVE PROTEIN LHE: 0.5 MG/DL (ref 0–?)
CALCIUM SERPL-MCNC: 8.6 MG/DL (ref 8.5–10.5)
CHLORIDE BLD-SCNC: 105 MMOL/L (ref 98–107)
CO2 SERPL-SCNC: 20 MMOL/L (ref 22–31)
CREAT SERPL-MCNC: 0.63 MG/DL (ref 0.6–1.1)
EOSINOPHIL # BLD AUTO: 0 10E3/UL (ref 0–0.7)
EOSINOPHIL NFR BLD AUTO: 0 %
ERYTHROCYTE [DISTWIDTH] IN BLOOD BY AUTOMATED COUNT: 12.4 % (ref 10–15)
FLUAV RNA SPEC QL NAA+PROBE: NEGATIVE
FLUBV RNA RESP QL NAA+PROBE: NEGATIVE
GFR SERPL CREATININE-BSD FRML MDRD: >90 ML/MIN/1.73M2
GLUCOSE BLD-MCNC: 95 MG/DL (ref 70–125)
HCT VFR BLD AUTO: 37.9 % (ref 35–47)
HGB BLD-MCNC: 13.1 G/DL (ref 11.7–15.7)
IMM GRANULOCYTES # BLD: 0.1 10E3/UL
IMM GRANULOCYTES NFR BLD: 1 %
LIPASE SERPL-CCNC: 26 U/L (ref 0–52)
LYMPHOCYTES # BLD AUTO: 0.1 10E3/UL (ref 0.8–5.3)
LYMPHOCYTES NFR BLD AUTO: 1 %
MCH RBC QN AUTO: 30.7 PG (ref 26.5–33)
MCHC RBC AUTO-ENTMCNC: 34.6 G/DL (ref 31.5–36.5)
MCV RBC AUTO: 89 FL (ref 78–100)
MONOCYTES # BLD AUTO: 0.5 10E3/UL (ref 0–1.3)
MONOCYTES NFR BLD AUTO: 6 %
NEUTROPHILS # BLD AUTO: 7.6 10E3/UL (ref 1.6–8.3)
NEUTROPHILS NFR BLD AUTO: 92 %
NRBC # BLD AUTO: 0 10E3/UL
NRBC BLD AUTO-RTO: 0 /100
PLATELET # BLD AUTO: 183 10E3/UL (ref 150–450)
POTASSIUM BLD-SCNC: 3.6 MMOL/L (ref 3.5–5)
PROT SERPL-MCNC: 6.5 G/DL (ref 6–8)
RBC # BLD AUTO: 4.27 10E6/UL (ref 3.8–5.2)
RSV RNA SPEC NAA+PROBE: NEGATIVE
SARS-COV-2 RNA RESP QL NAA+PROBE: POSITIVE
SODIUM SERPL-SCNC: 134 MMOL/L (ref 136–145)
WBC # BLD AUTO: 8.3 10E3/UL (ref 4–11)

## 2022-08-25 PROCEDURE — 83690 ASSAY OF LIPASE: CPT | Performed by: STUDENT IN AN ORGANIZED HEALTH CARE EDUCATION/TRAINING PROGRAM

## 2022-08-25 PROCEDURE — 250N000011 HC RX IP 250 OP 636: Performed by: STUDENT IN AN ORGANIZED HEALTH CARE EDUCATION/TRAINING PROGRAM

## 2022-08-25 PROCEDURE — 96374 THER/PROPH/DIAG INJ IV PUSH: CPT

## 2022-08-25 PROCEDURE — 96361 HYDRATE IV INFUSION ADD-ON: CPT

## 2022-08-25 PROCEDURE — 87637 SARSCOV2&INF A&B&RSV AMP PRB: CPT | Performed by: STUDENT IN AN ORGANIZED HEALTH CARE EDUCATION/TRAINING PROGRAM

## 2022-08-25 PROCEDURE — 36415 COLL VENOUS BLD VENIPUNCTURE: CPT | Performed by: STUDENT IN AN ORGANIZED HEALTH CARE EDUCATION/TRAINING PROGRAM

## 2022-08-25 PROCEDURE — 99284 EMERGENCY DEPT VISIT MOD MDM: CPT | Mod: CS,25

## 2022-08-25 PROCEDURE — 258N000003 HC RX IP 258 OP 636: Performed by: STUDENT IN AN ORGANIZED HEALTH CARE EDUCATION/TRAINING PROGRAM

## 2022-08-25 PROCEDURE — 86140 C-REACTIVE PROTEIN: CPT | Performed by: STUDENT IN AN ORGANIZED HEALTH CARE EDUCATION/TRAINING PROGRAM

## 2022-08-25 PROCEDURE — 250N000013 HC RX MED GY IP 250 OP 250 PS 637: Performed by: STUDENT IN AN ORGANIZED HEALTH CARE EDUCATION/TRAINING PROGRAM

## 2022-08-25 PROCEDURE — 85025 COMPLETE CBC W/AUTO DIFF WBC: CPT | Performed by: STUDENT IN AN ORGANIZED HEALTH CARE EDUCATION/TRAINING PROGRAM

## 2022-08-25 PROCEDURE — 82248 BILIRUBIN DIRECT: CPT | Performed by: STUDENT IN AN ORGANIZED HEALTH CARE EDUCATION/TRAINING PROGRAM

## 2022-08-25 PROCEDURE — C9803 HOPD COVID-19 SPEC COLLECT: HCPCS

## 2022-08-25 PROCEDURE — 80053 COMPREHEN METABOLIC PANEL: CPT | Performed by: STUDENT IN AN ORGANIZED HEALTH CARE EDUCATION/TRAINING PROGRAM

## 2022-08-25 RX ORDER — OXYCODONE HYDROCHLORIDE 5 MG/1
5 TABLET ORAL ONCE
Status: COMPLETED | OUTPATIENT
Start: 2022-08-25 | End: 2022-08-25

## 2022-08-25 RX ORDER — ACETAMINOPHEN 325 MG/1
975 TABLET ORAL ONCE
Status: COMPLETED | OUTPATIENT
Start: 2022-08-25 | End: 2022-08-25

## 2022-08-25 RX ORDER — ONDANSETRON 4 MG/1
4 TABLET, ORALLY DISINTEGRATING ORAL EVERY 6 HOURS PRN
Qty: 8 TABLET | Refills: 0 | Status: ON HOLD | OUTPATIENT
Start: 2022-08-25 | End: 2022-12-20

## 2022-08-25 RX ORDER — ONDANSETRON 2 MG/ML
4 INJECTION INTRAMUSCULAR; INTRAVENOUS ONCE
Status: COMPLETED | OUTPATIENT
Start: 2022-08-25 | End: 2022-08-25

## 2022-08-25 RX ADMIN — ONDANSETRON 4 MG: 2 INJECTION INTRAMUSCULAR; INTRAVENOUS at 08:15

## 2022-08-25 RX ADMIN — ACETAMINOPHEN 975 MG: 325 TABLET ORAL at 08:16

## 2022-08-25 RX ADMIN — SODIUM CHLORIDE 1000 ML: 9 INJECTION, SOLUTION INTRAVENOUS at 08:19

## 2022-08-25 RX ADMIN — OXYCODONE HYDROCHLORIDE 5 MG: 5 TABLET ORAL at 08:18

## 2022-08-25 NOTE — DISCHARGE INSTRUCTIONS
For your body aches or fevers we recommend trying;  Tylenol 1,000mg every 6 hours (as needed), up to 4,000mg/day    You are also being prescribed Zofran oral dissolving tablets for as needed use at home

## 2022-08-25 NOTE — ED TRIAGE NOTES
Reports nausea an vomiting starting at 0145. Reports headache, right hip and bilateral knee pain. 22 weeks pregnant. States her  and daughter are sick but have different symptoms.      Triage Assessment     Row Name 08/25/22 0701       Triage Assessment (Adult)    Airway WDL WDL       Respiratory WDL    Respiratory WDL WDL       Skin Circulation/Temperature WDL    Skin Circulation/Temperature WDL WDL       Cardiac WDL    Cardiac WDL WDL       Peripheral/Neurovascular WDL    Peripheral Neurovascular WDL WDL       Cognitive/Neuro/Behavioral WDL    Cognitive/Neuro/Behavioral WDL WDL

## 2022-08-25 NOTE — ED PROVIDER NOTES
EMERGENCY DEPARTMENT ENCOUNTER       ED Course & Medical Decision Making     7:45 AM I introduced myself to the patient, obtained patient history, performed a physical exam, and discussed plan for ED workup including potential diagnostic laboratory/imaging studies and interventions.  9:07 AM I rechecked the patient and updated them on laboratory results. We discussed the plan for discharge and the patient is agreeable. Reviewed supportive cares, symptomatic treatment, outpatient follow up, and reasons to return to the Emergency Department. Patient to be discharged by ED RN.     Final Impression  32 year old female presents for evaluation of vomiting that started at 0145 this morning, followed by headache, right hip pain, bilateral knee pain.  Currently 22 weeks pregnant.   and her 3-year-old daughter have been having fevers the last few days, though have not had any vomiting.  Patient does appear somewhat uncomfortable on initial exam, though not in extremis.  Patient given 1 L fluid bolus, dose of Tylenol, Zofran, and single tab of oral oxycodone and symptoms much improved on reevaluation.  Labs show grossly normal electrolytes, LFTs, lipase and CBC.  COVID did return positive, likely causing patient's symptoms of body aches, nausea, vomiting, especially in conjunction with fact that these 2 other family members have had fevers at home the last few days.  Per immunization registry, no prior documented history of COVID vaccination.  Discussed findings with patient, presumptive diagnosis of COVID-19 as being causative of her symptoms, discussed supportive cares with fluids, Tylenol, Zofran as needed.  Patient declined Zofran stating that she does not like to take medications or medicines, though nonetheless we will provide her with a written prescription just in case she changes her mind or becomes severely nauseous after discharge.  Return precautions discussed.  Will discharge home with paper prescription  for Zofran.    Prior to making a final disposition on this patient the results of patient's tests and other diagnostic studies were discussed with the patient. All questions were answered. Patient expressed understanding of the plan and was amenable to it.    Medications   0.9% sodium chloride BOLUS (1,000 mLs Intravenous New Bag 8/25/22 0819)   acetaminophen (TYLENOL) tablet 975 mg (975 mg Oral Given 8/25/22 0816)   ondansetron (ZOFRAN) injection 4 mg (4 mg Intravenous Given 8/25/22 0815)   oxyCODONE (ROXICODONE) tablet 5 mg (5 mg Oral Given 8/25/22 0818)       Final Impression     1. COVID-19    2. Non-intractable vomiting with nausea, unspecified vomiting type    3. Body aches      Chief Complaint     Chief Complaint   Patient presents with     Nausea & Vomiting     Reports nausea an vomiting starting at 0145. Reports headache, right hip and bilateral knee pain. 22 weeks pregnant. States her  and daughter are sick but have different symptoms.     ITZ Clarke is a 32 year old female who presents for evaluation of nausea and vomiting.    The patient is currently 22 weeks pregnant. She is presenting with nausea and vomiting that started at 0145 this morning. She has had 8 episodes of vomiting since her symptoms started this morning. After the first episode of vomiting she developed a headache. Around 0300 she developed pains in her bilateral knees and right hip. She has had occasional morning sickness with her pregnancy, but nothing like this. The patient's daughter and  have also been sick this week, both with fevers, but the patient has not developed a fever. She reports the baby has been kicking a lot.    I, Elvin Blankenship am serving as a scribe to document services personally performed by Dr. Nick Bowman MD, based on my observation and the provider's statements to me. I, Dr. Nick Bowman MD attest that Elvin Blankenship is acting in a scribe capacity, has observed my  "performance of the services and has documented them in accordance with my direction.    Past Medical History     Past Medical History:   Diagnosis Date     Depression      Postpartum depression      Past Surgical History:   Procedure Laterality Date     WISDOM TOOTH EXTRACTION       Family History   Problem Relation Age of Onset     No Known Problems Mother      No Known Problems Father      No Known Problems Sister      No Known Problems Sister      No Known Problems Brother      Colon Cancer Maternal Grandmother      Diabetes Maternal Grandmother      Hypertension Maternal Grandmother      Cancer Paternal Grandmother 60        Unknown type     Heart Disease Paternal Grandfather 60        Patient states \"they said he  of a broken heart\"     Colon Cancer Paternal Uncle 50      Social History     Tobacco Use     Smoking status: Former Smoker     Packs/day: 0.20     Years: 10.00     Pack years: 2.00     Quit date: 2022     Years since quittin.3     Smokeless tobacco: Never Used   Substance Use Topics     Alcohol use: No     Drug use: No     No Known Allergies    Relevant past medical, surgical, family and social history as documented above, has been reviewed and discussed with patient. No changes or additions, unless otherwise noted in the HPI.    Current Medications     Prenatal Vit-Fe Fumarate-FA (PRENATAL MULTIVITAMIN W/IRON) 27-0.8 MG tablet        Review of Systems     Constitutional: Denies fever, chills   HENT: Denies sore throat  Respiratory: Denies cough or shortness of breath.      Cardiovascular: Denies chest pain  GI:  Endorses nausea and vomiting, denies abdominal pain  : Denies dysuria, or flank pain.      Musculoskeletal:  Endorses bilateral knee pain, right hip pain, denies any new back pain.  Skin: Denies rashes or wound.      Neurologic:  Endorses headache, denies new weakness, focal weakness, or sensory changes.    Remainder of systems reviewed, unless noted in HPI all others " negative.    Physical Exam     /59   Pulse 117   Temp 98.3  F (36.8  C) (Temporal)   Resp 18   Ht 1.524 m (5')   Wt 59.9 kg (132 lb)   LMP 03/28/2022   SpO2 98%   BMI 25.78 kg/m    Constitutional: Awake, alert, in no acute distress.      Head: Normocephalic, atraumatic.      ENT: Mucous membranes moist.      Eyes: PERRL, Conjunctiva normal.      Respiratory: Respirations even, unlabored. Lungs clear to ascultation bilaterally, in no acute respiratory distress.      Cardiovascular: Sinus tachycardia. +2 radial pulses, equal bilaterally.    GI: Abdomen soft, non-tender   Musculoskeletal: Moves all 4 extremities equally, strength symmetrical on bilateral uppers and lowers.  No warmth erythema of either knee.      Integument: Warm, dry.   Neurologic: Alert & oriented x 3. Normal speech. Grossly normal motor and sensory function. No focal deficits noted.      Psychiatric: Normal mood and affect.    Labs & Imaging     Results for orders placed or performed during the hospital encounter of 08/25/22   Basic metabolic panel   Result Value Ref Range    Sodium 134 (L) 136 - 145 mmol/L    Potassium 3.6 3.5 - 5.0 mmol/L    Chloride 105 98 - 107 mmol/L    Carbon Dioxide (CO2) 20 (L) 22 - 31 mmol/L    Anion Gap 9 5 - 18 mmol/L    Urea Nitrogen 5 (L) 8 - 22 mg/dL    Creatinine 0.63 0.60 - 1.10 mg/dL    Calcium 8.6 8.5 - 10.5 mg/dL    Glucose 95 70 - 125 mg/dL    GFR Estimate >90 >60 mL/min/1.73m2   Hepatic function panel   Result Value Ref Range    Bilirubin Total 0.4 0.0 - 1.0 mg/dL    Bilirubin Direct 0.1 <=0.5 mg/dL    Protein Total 6.5 6.0 - 8.0 g/dL    Albumin 3.2 (L) 3.5 - 5.0 g/dL    Alkaline Phosphatase 80 45 - 120 U/L    AST 19 0 - 40 U/L    ALT 14 0 - 45 U/L   Result Value Ref Range    Lipase 26 0 - 52 U/L   Symptomatic; Unknown Influenza A/B & SARS-CoV2 (COVID-19) Virus PCR Multiplex Nasopharyngeal    Specimen: Nasopharyngeal; Swab   Result Value Ref Range    Influenza A PCR Negative Negative    Influenza B  PCR Negative Negative    RSV PCR Negative Negative    SARS CoV2 PCR Positive (A) Negative   CRP inflammation   Result Value Ref Range    CRP 0.5 0.0 - <0.8 mg/dL   CBC with platelets and differential   Result Value Ref Range    WBC Count 8.3 4.0 - 11.0 10e3/uL    RBC Count 4.27 3.80 - 5.20 10e6/uL    Hemoglobin 13.1 11.7 - 15.7 g/dL    Hematocrit 37.9 35.0 - 47.0 %    MCV 89 78 - 100 fL    MCH 30.7 26.5 - 33.0 pg    MCHC 34.6 31.5 - 36.5 g/dL    RDW 12.4 10.0 - 15.0 %    Platelet Count 183 150 - 450 10e3/uL    % Neutrophils 92 %    % Lymphocytes 1 %    % Monocytes 6 %    % Eosinophils 0 %    % Basophils 0 %    % Immature Granulocytes 1 %    NRBCs per 100 WBC 0 <1 /100    Absolute Neutrophils 7.6 1.6 - 8.3 10e3/uL    Absolute Lymphocytes 0.1 (L) 0.8 - 5.3 10e3/uL    Absolute Monocytes 0.5 0.0 - 1.3 10e3/uL    Absolute Eosinophils 0.0 0.0 - 0.7 10e3/uL    Absolute Basophils 0.0 0.0 - 0.2 10e3/uL    Absolute Immature Granulocytes 0.1 <=0.4 10e3/uL    Absolute NRBCs 0.0 10e3/uL        Nick Bowman MD  08/25/22 0937

## 2022-09-08 ENCOUNTER — MEDICAL CORRESPONDENCE (OUTPATIENT)
Dept: HEALTH INFORMATION MANAGEMENT | Facility: CLINIC | Age: 32
End: 2022-09-08

## 2022-09-16 ENCOUNTER — PRENATAL OFFICE VISIT (OUTPATIENT)
Dept: MIDWIFE SERVICES | Facility: CLINIC | Age: 32
End: 2022-09-16
Payer: COMMERCIAL

## 2022-09-16 VITALS
HEART RATE: 76 BPM | WEIGHT: 144 LBS | SYSTOLIC BLOOD PRESSURE: 96 MMHG | BODY MASS INDEX: 28.12 KG/M2 | DIASTOLIC BLOOD PRESSURE: 52 MMHG

## 2022-09-16 DIAGNOSIS — K21.00 GASTROESOPHAGEAL REFLUX DISEASE WITH ESOPHAGITIS WITHOUT HEMORRHAGE: ICD-10-CM

## 2022-09-16 DIAGNOSIS — O09.299 HISTORY OF DELIVERY BY VACUUM EXTRACTION, CURRENTLY PREGNANT: ICD-10-CM

## 2022-09-16 DIAGNOSIS — O98.512 COVID-19 AFFECTING PREGNANCY IN SECOND TRIMESTER: ICD-10-CM

## 2022-09-16 DIAGNOSIS — U07.1 COVID-19 AFFECTING PREGNANCY IN SECOND TRIMESTER: ICD-10-CM

## 2022-09-16 DIAGNOSIS — Z34.80 ENCOUNTER FOR SUPERVISION OF OTHER NORMAL PREGNANCY, UNSPECIFIED TRIMESTER: Primary | ICD-10-CM

## 2022-09-16 PROBLEM — N92.6 MISSED MENSES: Status: RESOLVED | Noted: 2022-05-11 | Resolved: 2022-09-16

## 2022-09-16 PROCEDURE — 99207 PR PRENATAL VISIT: CPT | Performed by: ADVANCED PRACTICE MIDWIFE

## 2022-09-16 RX ORDER — PRENATAL VIT/IRON FUM/FOLIC AC 27MG-0.8MG
1 TABLET ORAL DAILY
Qty: 90 TABLET | Refills: 3 | Status: SHIPPED | OUTPATIENT
Start: 2022-09-16 | End: 2023-04-24

## 2022-09-16 RX ORDER — MULTIVIT WITH MINERALS/LUTEIN
250 TABLET ORAL DAILY
Qty: 30 TABLET | Refills: 3 | Status: SHIPPED | OUTPATIENT
Start: 2022-09-16 | End: 2023-04-24

## 2022-09-16 RX ORDER — FAMOTIDINE 20 MG/1
20 TABLET, FILM COATED ORAL 2 TIMES DAILY
Qty: 60 TABLET | Refills: 3 | Status: ON HOLD | OUTPATIENT
Start: 2022-09-16 | End: 2022-12-20

## 2022-09-16 NOTE — PROGRESS NOTES
Charlotte presents to the clinic alone.  She reports COVID-19 POSITIVE at the [facility] ER on 2022, presenting symptom was vomiting and bilateral knee pain.  No history of COVID-19 vaccination.  Feeling much better now!  Discussed recommendation for a fetal growth ultrasound at 32 weeks due to COVID-19 infection during pregnancy, and patient agrees.  Next visit: 1 hour GCT, hemoglobin and RPR.  Please offer Tdap; she understands the  benefits.  Written information was given today regarding [place] options, GDM screening, vaccines and pregnancy, Tdap and syphilis.  DECLINES influenza vaccination.  Reports she is NOT taking extra iron supplementation.  Prescription for Slow Fe and vitamin C as well as PNV sent to preferred pharmacy.  GERD symptoms that Tums is not helping.  Prescription for famotidine 20 mg p.o. twice daily, #60, 3 refills sent to preferred pharmacy.  Baby is active, and she denies regular uterine contractions, loss of fluid or vaginal bleeding.  Despite COVID-19 infection causing nausea and vomiting, 6-week interval weight gain: 8 pounds, total weight gain excessive at 31 pounds, pregravid BMI 22.   labor precautions and danger signs and symptoms reviewed.  All questions answered.  Encouraged to call or return to clinic with any questions, concerns, or as needed.

## 2022-09-25 ENCOUNTER — HEALTH MAINTENANCE LETTER (OUTPATIENT)
Age: 32
End: 2022-09-25

## 2022-10-06 ENCOUNTER — NURSE TRIAGE (OUTPATIENT)
Dept: NURSING | Facility: CLINIC | Age: 32
End: 2022-10-06

## 2022-10-06 NOTE — TELEPHONE ENCOUNTER
Pt states that she is 38 weeks pregnant and states that her right breast is very sore with a spot on it that looks like a blister     Rates pain 6/10    No fever     Per Disposition: See in Office Within 3 Days    Pt has an appointment scheduled for tomorrow     Care advice given per protocol and when to call back. Pt verbalized understanding and agrees to plan of care.    Sandra Davis RN  [place] Nurse Advisor  9:22 AM 10/6/2022      COVID 19 Nurse Triage Plan/Patient Instructions    Please be aware that novel coronavirus (COVID-19) may be circulating in the community. If you develop symptoms such as fever, cough, or SOB or if you have concerns about the presence of another infection including coronavirus (COVID-19), please contact your health care provider or visit [email or web address].     Disposition/Instructions    In-Person Visit with provider recommended. Reference Visit Selection Guide.    Thank you for taking steps to prevent the spread of this virus.  o Limit your contact with others.  o Wear a simple mask to cover your cough.  o Wash your hands well and often.    Resources    [facility]: About COVID-19: [email or web address]    CDC: What to Do If You're Sick: www.cdc.gov/coronavirus/2019-ncov/about/steps-when-sick.html    CDC: Ending Home Isolation: www.cdc.gov/coronavirus/2019-ncov/hcp/disposition-in-home-patients.html     CDC: Caring for Someone: www.cdc.gov/coronavirus/2019-ncov/if-you-are-sick/care-for-someone.html     [facility]: Interim Guidance for Hospital Discharge to Home: www.health.[facility].mn.us/diseases/coronavirus/hcp/hospdischarge.pdf    [facility] clinical trials (COVID-19 research studies): [email or web address]/[email or web address]-clinical-trials     Below are the COVID-19 hotlines at the [place] Department of Health ([facility]). Interpreters are available.   o For health questions: Call 490-138-8118 or 1-819.927.2080 (7 a.m. to 7 p.m.)  o For questions about schools  "and childcare: Call 780-095-0984 or 1-824.603.9244 (7 a.m. to 7 p.m.)                           Reason for Disposition    Change in shape or appearance of breast    Additional Information    Negative: Chest pain    Negative: Breastfeeding questions about baby    Negative: Breastfeeding questions about mother (breast symptoms or feeling sick)    Negative: Breastfeeding questions about mother's medicines and diet    Negative: Postpartum breast pain and swelling, not breastfeeding    Negative: Small spot, skin growth or mole    Negative: SEVERE breast pain and fever > 103 F  (39.4 C)    Negative: Patient sounds very sick or weak to the triager    Negative: Breast looks infected (spreading redness, feels hot or painful to touch) and fever    Negative: Breast looks infected (spreading redness, feels hot or painful to touch) and no fever    Negative: Painful rash and multiple small blisters grouped together (i.e., dermatomal distribution or \"band\" or \"stripe\")    Negative: Cuts, burns, or bruises of breasts and suspicious history for the injury    Negative: Dimpling of skin of breast (i.e., skin looks like the outside of an orange peel)    Negative: Breast lump    Negative: Nipple discharge and bloody    Negative: Nipple is inverted (i.e., points inward)  (Exception: long-term physical characteristic, present for many years)    Negative: Dry flaking-peeling skin of nipple    Protocols used: BREAST SYMPTOMS-A-OH      "

## 2022-10-07 ENCOUNTER — PRENATAL OFFICE VISIT (OUTPATIENT)
Dept: MIDWIFE SERVICES | Facility: CLINIC | Age: 32
End: 2022-10-07
Payer: COMMERCIAL

## 2022-10-07 VITALS
SYSTOLIC BLOOD PRESSURE: 110 MMHG | WEIGHT: 152 LBS | HEART RATE: 72 BPM | DIASTOLIC BLOOD PRESSURE: 64 MMHG | BODY MASS INDEX: 29.69 KG/M2

## 2022-10-07 DIAGNOSIS — O09.299 HISTORY OF DELIVERY BY VACUUM EXTRACTION, CURRENTLY PREGNANT: ICD-10-CM

## 2022-10-07 DIAGNOSIS — T14.8XXA OPEN WOUND: ICD-10-CM

## 2022-10-07 DIAGNOSIS — Z30.9 ENCOUNTER FOR CONTRACEPTIVE MANAGEMENT, UNSPECIFIED TYPE: ICD-10-CM

## 2022-10-07 DIAGNOSIS — U07.1 COVID-19 AFFECTING PREGNANCY IN SECOND TRIMESTER: ICD-10-CM

## 2022-10-07 DIAGNOSIS — O98.512 COVID-19 AFFECTING PREGNANCY IN SECOND TRIMESTER: ICD-10-CM

## 2022-10-07 DIAGNOSIS — Z86.59 HISTORY OF POSTPARTUM DEPRESSION: ICD-10-CM

## 2022-10-07 DIAGNOSIS — Z87.59 HISTORY OF POSTPARTUM DEPRESSION: ICD-10-CM

## 2022-10-07 DIAGNOSIS — O26.03 EXCESSIVE WEIGHT GAIN DURING PREGNANCY IN THIRD TRIMESTER: ICD-10-CM

## 2022-10-07 DIAGNOSIS — Z34.80 ENCOUNTER FOR SUPERVISION OF OTHER NORMAL PREGNANCY, UNSPECIFIED TRIMESTER: Primary | ICD-10-CM

## 2022-10-07 LAB
GLUCOSE 1H P 50 G GLC PO SERPL-MCNC: 94 MG/DL (ref 70–129)
HGB BLD-MCNC: 12.4 G/DL (ref 11.7–15.7)
HOLD SPECIMEN: NORMAL
T PALLIDUM AB SER QL: NONREACTIVE

## 2022-10-07 PROCEDURE — 82950 GLUCOSE TEST: CPT | Performed by: ADVANCED PRACTICE MIDWIFE

## 2022-10-07 PROCEDURE — 36415 COLL VENOUS BLD VENIPUNCTURE: CPT | Performed by: ADVANCED PRACTICE MIDWIFE

## 2022-10-07 PROCEDURE — 99213 OFFICE O/P EST LOW 20 MIN: CPT | Performed by: ADVANCED PRACTICE MIDWIFE

## 2022-10-07 PROCEDURE — 86780 TREPONEMA PALLIDUM: CPT | Performed by: ADVANCED PRACTICE MIDWIFE

## 2022-10-07 PROCEDURE — 99207 PR PRENATAL VISIT: CPT | Performed by: ADVANCED PRACTICE MIDWIFE

## 2022-10-07 PROCEDURE — 85018 HEMOGLOBIN: CPT | Performed by: ADVANCED PRACTICE MIDWIFE

## 2022-10-07 NOTE — PROGRESS NOTES
"Charlotte presents by herself.  Labs today: 1 hour GCT, hemoglobin and RPR.  She is taking oral iron supplementation as directed.  She declines Tdap and influenza vaccine today; she wants to discuss this with her  and may be accepting next visit.  She understands  benefits.  Awakened yesterday morning with a sore spot on her right breast about 10:00.  \"The area was itchy, I scratched it, and it opened, oozing yellowish fluid.\"  Putting Neosporin and a Band-Aid on it.  It still itches some.  It is slightly tender.  She has never had anything like this before.  She states she did, the day before, place paper money and her bra between her breast and bra and is worried that the money was dirty.  She denies fever or chills.  The right breast also has been leaking what she believes may be colostrum, none from the left side.  She has not taken anything for pain and does not want to take acetaminophen.  On PE, 6 mm x 6 mm wound with yellow base and pink perimeter oozing yellow discharge.  There is no erythema or streaking.  This writer does not appreciate any nipple discharge.  Most likely a spider bite.  Recommend soap and water and leaving open to the air with a cotton shirt on during the night, may use Neosporin/triple antibiotic ointment with a Band-Aid during the day.  If symptoms persist or worsen, follow-up in walk-in care or next week with this writer.  Oral antibiotics are not indicated at this time.  Fetal growth ultrasound ordered to be performed at 32 weeks due to COVID-19 infection during pregnancy 2022.  28-week pregnancy checklist completed.  May desire permanent sterilization, hoping her  will agree to a vasectomy.  However, interested in bilateral tubal ligation; referred to OB/GYN for permanent sterilization consultation.  Otherwise, patient desires Mirena IUD.  Total weight gain has been excessive at 39 pounds with pregravid BMI 22.  Baby is active, and she denies regular uterine " contractions, loss of fluid or vaginal bleeding.  Written information given regarding  labor and fetal movement counting  labor precautions and danger signs and symptoms reviewed.  All questions answered.  Encouraged daily fetal movement counting and to call or return to clinic with any questions, concerns, or as needed.

## 2022-11-04 ENCOUNTER — HOSPITAL ENCOUNTER (OUTPATIENT)
Dept: ULTRASOUND IMAGING | Facility: HOSPITAL | Age: 32
Discharge: HOME | End: 2022-11-04
Attending: ADVANCED PRACTICE MIDWIFE | Admitting: ADVANCED PRACTICE MIDWIFE
Payer: COMMERCIAL

## 2022-11-04 DIAGNOSIS — U07.1 COVID-19 AFFECTING PREGNANCY IN SECOND TRIMESTER: ICD-10-CM

## 2022-11-04 DIAGNOSIS — Z34.80 ENCOUNTER FOR SUPERVISION OF OTHER NORMAL PREGNANCY, UNSPECIFIED TRIMESTER: ICD-10-CM

## 2022-11-04 DIAGNOSIS — O98.512 COVID-19 AFFECTING PREGNANCY IN SECOND TRIMESTER: ICD-10-CM

## 2022-11-04 PROCEDURE — 76816 OB US FOLLOW-UP PER FETUS: CPT

## 2022-11-11 ENCOUNTER — PRENATAL OFFICE VISIT (OUTPATIENT)
Dept: MIDWIFE SERVICES | Facility: CLINIC | Age: 32
End: 2022-11-11
Payer: COMMERCIAL

## 2022-11-11 VITALS
OXYGEN SATURATION: 98 % | WEIGHT: 163 LBS | DIASTOLIC BLOOD PRESSURE: 62 MMHG | HEART RATE: 94 BPM | BODY MASS INDEX: 32 KG/M2 | HEIGHT: 60 IN | SYSTOLIC BLOOD PRESSURE: 104 MMHG

## 2022-11-11 DIAGNOSIS — K21.9 GASTROESOPHAGEAL REFLUX DISEASE WITHOUT ESOPHAGITIS: ICD-10-CM

## 2022-11-11 DIAGNOSIS — R10.2 PELVIC PAIN IN PREGNANCY: ICD-10-CM

## 2022-11-11 DIAGNOSIS — O26.899 PELVIC PAIN IN PREGNANCY: ICD-10-CM

## 2022-11-11 DIAGNOSIS — O26.03 EXCESSIVE WEIGHT GAIN DURING PREGNANCY IN THIRD TRIMESTER: ICD-10-CM

## 2022-11-11 DIAGNOSIS — Z34.80 ENCOUNTER FOR SUPERVISION OF OTHER NORMAL PREGNANCY, UNSPECIFIED TRIMESTER: Primary | ICD-10-CM

## 2022-11-11 PROCEDURE — 99207 PR PRENATAL VISIT: CPT | Performed by: ADVANCED PRACTICE MIDWIFE

## 2022-11-11 NOTE — PROGRESS NOTES
Please offer Tdap when RTC.  Charlotte presents with her daughter.  Overall, feeling well.  Normal labs last visit reviewed: 1 hour GCT 94 mg/dL, hemoglobin 12.4 g/dL and RPR nonreactive.  Offers of few concerns.  GERD symptoms are not completely relieved with Pepcid twice daily.  This writer prescribed omeprazole 20 mg p.o. daily.  Avoid triggers.  Additionally, bilateral hip pain with growing baby.  This writer recommended comfort measures such as: Warm hydrotherapy, massage, acetaminophen as directed, maternity belt (prescription generated today and encourage patient to go to medical supply now).  Fetal growth ultrasound performed on 2022 for history of COVID this pregnancy.  VAN and fetal growth WNL 1848 g/45%, CEPHALIC presentation.  Declines influenza vaccine.  Written information was given regarding hospital preregistration, breast pump questions, writing a birth plan, water birth education, car seats, breast-feeding and birth control, NFP and IUDs.   labor precautions and danger signs and symptoms reviewed.  All questions answered.  Encouraged daily fetal movement counting and to call or return to clinic with any questions, concerns, or as needed.

## 2022-11-14 ENCOUNTER — TELEPHONE (OUTPATIENT)
Dept: MIDWIFE SERVICES | Facility: CLINIC | Age: 32
End: 2022-11-14

## 2022-11-14 NOTE — TELEPHONE ENCOUNTER
Pt has been coughing for 2 days now. Pls call to advise what she can take. She thinks the baby has dropped due to her non stop coughing.

## 2022-11-15 NOTE — TELEPHONE ENCOUNTER
Telephone call to patient, states she has had a cough and congestion for 2 days and was told there were no medications over the counter that she could take during pregnancy. Patient states she took a Covid test and it was negative. Denies fever or shortness of breath.  States she is having some pain below her sternum that she thinks is most likely related to coughing. Discussed with patient comfort measures for a cold and to increase her oral fluids. Patient has been taking Tylenol as needed with some relief of symptoms. Patient also reports some mild body aches and headache. Reports that her daughter was positive for flu recently but she has not been tested. Encouraged patient to have Influenza testing at Urgent Care. Patient in agreement. The [facility] Colds in Pregnancy information sheet was sent to patient via [employer] and patient was encouraged to reach out to [facility] on call with any questions or concerns. All questions answered.

## 2022-11-28 ENCOUNTER — PRENATAL OFFICE VISIT (OUTPATIENT)
Dept: MIDWIFE SERVICES | Facility: CLINIC | Age: 32
End: 2022-11-28
Payer: COMMERCIAL

## 2022-11-28 VITALS
SYSTOLIC BLOOD PRESSURE: 118 MMHG | WEIGHT: 168 LBS | DIASTOLIC BLOOD PRESSURE: 56 MMHG | HEART RATE: 68 BPM | BODY MASS INDEX: 32.81 KG/M2

## 2022-11-28 DIAGNOSIS — Z34.80 ENCOUNTER FOR SUPERVISION OF OTHER NORMAL PREGNANCY, UNSPECIFIED TRIMESTER: Primary | ICD-10-CM

## 2022-11-28 DIAGNOSIS — U07.1 COVID-19 AFFECTING PREGNANCY IN SECOND TRIMESTER: ICD-10-CM

## 2022-11-28 DIAGNOSIS — O98.512 COVID-19 AFFECTING PREGNANCY IN SECOND TRIMESTER: ICD-10-CM

## 2022-11-28 DIAGNOSIS — O26.03 EXCESSIVE WEIGHT GAIN DURING PREGNANCY IN THIRD TRIMESTER: ICD-10-CM

## 2022-11-28 DIAGNOSIS — O09.299 HISTORY OF DELIVERY BY VACUUM EXTRACTION, CURRENTLY PREGNANT: ICD-10-CM

## 2022-11-28 DIAGNOSIS — K21.9 GASTROESOPHAGEAL REFLUX DISEASE WITHOUT ESOPHAGITIS: ICD-10-CM

## 2022-11-28 PROCEDURE — 99207 PR PRENATAL VISIT: CPT | Performed by: ADVANCED PRACTICE MIDWIFE

## 2022-11-28 ASSESSMENT — EDINBURGH POSTNATAL DEPRESSION SCALE (EPDS)
I HAVE BLAMED MYSELF UNNECESSARILY WHEN THINGS WENT WRONG: NO, NEVER
I HAVE FELT SAD OR MISERABLE: NO, NOT AT ALL
THINGS HAVE BEEN GETTING ON TOP OF ME: NO, I HAVE BEEN COPING AS WELL AS EVER
I HAVE BEEN SO UNHAPPY THAT I HAVE BEEN CRYING: NO, NEVER
I HAVE BEEN ABLE TO LAUGH AND SEE THE FUNNY SIDE OF THINGS: AS MUCH AS I ALWAYS COULD
I HAVE BEEN ANXIOUS OR WORRIED FOR NO GOOD REASON: NO, NOT AT ALL
I HAVE BEEN SO UNHAPPY THAT I HAVE HAD DIFFICULTY SLEEPING: NOT AT ALL
I HAVE LOOKED FORWARD WITH ENJOYMENT TO THINGS: AS MUCH AS I EVER DID
THE THOUGHT OF HARMING MYSELF HAS OCCURRED TO ME: NEVER
I HAVE FELT SCARED OR PANICKY FOR NO GOOD REASON: NO, NOT AT ALL
TOTAL SCORE: 0

## 2022-11-30 PROBLEM — T14.8XXA OPEN WOUND: Status: RESOLVED | Noted: 2022-10-07 | Resolved: 2022-11-30

## 2022-11-30 NOTE — PROGRESS NOTES
"Please offer Tdap when RTC.  Charlotte presents by herself.  Cough x2 weeks, improving.  She denies fever, chills, pharyngitis or myalgias.  Bilateral rib pain she believes from continuous coughing; comfort measures reviewed.  Left hip bothers her at times.  Massage by her  and acetaminophen not altogether helpful; comfort measures reviewed.  Vomited before bed on Friday, Saturday and  evening.  \"I have never slept better than after throwing up.\"  She believes she is over filling for dinner and vomits due to \"running out of room\" in her stomach.  This writer recommends half portion for dinner.  Baby is active, and she denies headache, visual disturbances, right upper quadrant abdominal pain (other than rib pain from coughing), regular uterine contractions, loss of fluid or vaginal bleeding.  Endorses [place] contractions.  Next visit: GBS RV culture and hemoglobin.  Patient is taking an oral iron supplement every other day as directed.  Total weight gain more than expected at 55 pounds with pregravid BMI 22.  Patient desires IOL ASAP.  Explained that elective IOL can be scheduled for 39+0 weeks if cervix Florez score is at or greater than 5 as a multiparous woman.   labor precautions and danger signs and symptoms reviewed.  All questions answered.  Encouraged daily fetal movement counting and to call or return to clinic with any questions, concerns, or as needed.  "

## 2022-12-02 ENCOUNTER — PRENATAL OFFICE VISIT (OUTPATIENT)
Dept: MIDWIFE SERVICES | Facility: CLINIC | Age: 32
End: 2022-12-02
Payer: COMMERCIAL

## 2022-12-02 VITALS
DIASTOLIC BLOOD PRESSURE: 56 MMHG | HEART RATE: 68 BPM | BODY MASS INDEX: 32.81 KG/M2 | SYSTOLIC BLOOD PRESSURE: 104 MMHG | WEIGHT: 168 LBS

## 2022-12-02 DIAGNOSIS — O09.299 HISTORY OF DELIVERY BY VACUUM EXTRACTION, CURRENTLY PREGNANT: ICD-10-CM

## 2022-12-02 DIAGNOSIS — K21.9 GASTROESOPHAGEAL REFLUX DISEASE WITHOUT ESOPHAGITIS: ICD-10-CM

## 2022-12-02 DIAGNOSIS — O98.512 COVID-19 AFFECTING PREGNANCY IN SECOND TRIMESTER: ICD-10-CM

## 2022-12-02 DIAGNOSIS — Z34.80 ENCOUNTER FOR SUPERVISION OF OTHER NORMAL PREGNANCY, UNSPECIFIED TRIMESTER: Primary | ICD-10-CM

## 2022-12-02 DIAGNOSIS — U07.1 COVID-19 AFFECTING PREGNANCY IN SECOND TRIMESTER: ICD-10-CM

## 2022-12-02 DIAGNOSIS — O26.03 EXCESSIVE WEIGHT GAIN DURING PREGNANCY IN THIRD TRIMESTER: ICD-10-CM

## 2022-12-02 PROCEDURE — 99207 PR PRENATAL VISIT: CPT | Performed by: ADVANCED PRACTICE MIDWIFE

## 2022-12-03 NOTE — PROGRESS NOTES
Charlotte presents with her daughter Melany.  Feeling well.  Cough continues to improve.  Upper abdominal discomfort improving as well as cough is lessening.  Baby is active, and she denies headache, visual disturbances, right upper quadrant abdominal pain (other than described apparent musculoskeletal discomfort) regular uterine contractions, loss of fluid or vaginal bleeding.  No vomiting before bed since decreasing the size of her dinner.  She has had good nights of sleep.  Next visit: GBS RV culture and hemoglobin.   labor precautions and danger signs symptoms reviewed.  All questions answered.  Encouraged daily fetal movement counting and to call or return to clinic with any questions, concerns, or as needed.

## 2022-12-07 ENCOUNTER — HOSPITAL ENCOUNTER (OUTPATIENT)
Facility: CLINIC | Age: 32
End: 2022-12-07
Admitting: ADVANCED PRACTICE MIDWIFE
Payer: COMMERCIAL

## 2022-12-09 ENCOUNTER — PRENATAL OFFICE VISIT (OUTPATIENT)
Dept: MIDWIFE SERVICES | Facility: CLINIC | Age: 32
End: 2022-12-09
Payer: COMMERCIAL

## 2022-12-09 ENCOUNTER — HOSPITAL ENCOUNTER (OUTPATIENT)
Dept: ULTRASOUND IMAGING | Facility: HOSPITAL | Age: 32
Discharge: HOME | End: 2022-12-09
Attending: ADVANCED PRACTICE MIDWIFE | Admitting: ADVANCED PRACTICE MIDWIFE
Payer: COMMERCIAL

## 2022-12-09 VITALS
HEART RATE: 80 BPM | SYSTOLIC BLOOD PRESSURE: 112 MMHG | BODY MASS INDEX: 33.4 KG/M2 | WEIGHT: 171 LBS | DIASTOLIC BLOOD PRESSURE: 64 MMHG

## 2022-12-09 DIAGNOSIS — O36.8130 DECREASED FETAL MOVEMENTS IN THIRD TRIMESTER, SINGLE OR UNSPECIFIED FETUS: ICD-10-CM

## 2022-12-09 DIAGNOSIS — O26.03 EXCESSIVE WEIGHT GAIN DURING PREGNANCY IN THIRD TRIMESTER: ICD-10-CM

## 2022-12-09 DIAGNOSIS — Z34.80 ENCOUNTER FOR SUPERVISION OF OTHER NORMAL PREGNANCY, UNSPECIFIED TRIMESTER: Primary | ICD-10-CM

## 2022-12-09 DIAGNOSIS — K21.9 GASTROESOPHAGEAL REFLUX DISEASE WITHOUT ESOPHAGITIS: ICD-10-CM

## 2022-12-09 DIAGNOSIS — O12.03: ICD-10-CM

## 2022-12-09 DIAGNOSIS — Z34.80 ENCOUNTER FOR SUPERVISION OF OTHER NORMAL PREGNANCY, UNSPECIFIED TRIMESTER: ICD-10-CM

## 2022-12-09 LAB
ALBUMIN MFR UR ELPH: 12.2 MG/DL
ALBUMIN SERPL BCG-MCNC: 3.1 G/DL (ref 3.5–5.2)
ALP SERPL-CCNC: 190 U/L (ref 35–104)
ALT SERPL W P-5'-P-CCNC: 9 U/L (ref 10–35)
ANION GAP SERPL CALCULATED.3IONS-SCNC: 10 MMOL/L (ref 7–15)
AST SERPL W P-5'-P-CCNC: 20 U/L (ref 10–35)
BILIRUB SERPL-MCNC: 0.2 MG/DL
BUN SERPL-MCNC: 8.3 MG/DL (ref 6–20)
CALCIUM SERPL-MCNC: 8.7 MG/DL (ref 8.6–10)
CHLORIDE SERPL-SCNC: 105 MMOL/L (ref 98–107)
CREAT SERPL-MCNC: 0.63 MG/DL (ref 0.51–0.95)
CREAT UR-MCNC: 67.2 MG/DL
DEPRECATED HCO3 PLAS-SCNC: 21 MMOL/L (ref 22–29)
ERYTHROCYTE [DISTWIDTH] IN BLOOD BY AUTOMATED COUNT: 13 % (ref 10–15)
GFR SERPL CREATININE-BSD FRML MDRD: >90 ML/MIN/1.73M2
GLUCOSE SERPL-MCNC: 96 MG/DL (ref 70–99)
HCT VFR BLD AUTO: 36.3 % (ref 35–47)
HGB BLD-MCNC: 12.2 G/DL (ref 11.7–15.7)
MCH RBC QN AUTO: 29.6 PG (ref 26.5–33)
MCHC RBC AUTO-ENTMCNC: 33.6 G/DL (ref 31.5–36.5)
MCV RBC AUTO: 88 FL (ref 78–100)
PLATELET # BLD AUTO: 172 10E3/UL (ref 150–450)
POTASSIUM SERPL-SCNC: 4.4 MMOL/L (ref 3.4–5.3)
PROT SERPL-MCNC: 5.9 G/DL (ref 6.4–8.3)
PROT/CREAT 24H UR: 0.18 MG/MG CR (ref 0–0.2)
RBC # BLD AUTO: 4.12 10E6/UL (ref 3.8–5.2)
SODIUM SERPL-SCNC: 136 MMOL/L (ref 136–145)
WBC # BLD AUTO: 10 10E3/UL (ref 4–11)

## 2022-12-09 PROCEDURE — 87653 STREP B DNA AMP PROBE: CPT | Performed by: ADVANCED PRACTICE MIDWIFE

## 2022-12-09 PROCEDURE — 80053 COMPREHEN METABOLIC PANEL: CPT | Performed by: ADVANCED PRACTICE MIDWIFE

## 2022-12-09 PROCEDURE — 84156 ASSAY OF PROTEIN URINE: CPT | Performed by: ADVANCED PRACTICE MIDWIFE

## 2022-12-09 PROCEDURE — 36415 COLL VENOUS BLD VENIPUNCTURE: CPT | Performed by: ADVANCED PRACTICE MIDWIFE

## 2022-12-09 PROCEDURE — 76819 FETAL BIOPHYS PROFIL W/O NST: CPT

## 2022-12-09 PROCEDURE — 59025 FETAL NON-STRESS TEST: CPT | Performed by: ADVANCED PRACTICE MIDWIFE

## 2022-12-09 PROCEDURE — 99207 PR PRENATAL VISIT: CPT | Performed by: ADVANCED PRACTICE MIDWIFE

## 2022-12-09 PROCEDURE — 85027 COMPLETE CBC AUTOMATED: CPT | Performed by: ADVANCED PRACTICE MIDWIFE

## 2022-12-09 NOTE — PROGRESS NOTES
"Charlotte presents alone.  Reports decreased fetal movement.  Baby moves the most at 6 AM and after dinner and continues to.  \"When I am at work, seated all day, I do not feel a lot of movement.\"  NST: 135, moderate variability, accelerations present, decelerations absent without uterine contractions.  This writer recommended BPP today, scheduled at approximately 5 PM, verify fetal presentation.  Patient is known to this writer, and eyelids appear edematous.  She reports some numbness in her middle and ring finger bilaterally, swollen hands and feet.  She denies headache, visual disturbances, right upper quadrant abdominal pain, regular uterine contractions, loss of fluid or vaginal bleeding.  This writer recommends HELLP labs today considering significant gestational edema, NORMOTENSIVE.   labor precautions and danger signs and symptoms reviewed.  All questions answered.  Encouraged strict daily fetal movement counting and to contact on-call CNM with persistent decreased fetal movement in 48 hours or sooner as needed.  Call or RTC with any questions, concerns, or as needed.  "

## 2022-12-10 LAB — GP B STREP DNA SPEC QL NAA+PROBE: NEGATIVE

## 2022-12-16 ENCOUNTER — PRENATAL OFFICE VISIT (OUTPATIENT)
Dept: MIDWIFE SERVICES | Facility: CLINIC | Age: 32
End: 2022-12-16
Payer: COMMERCIAL

## 2022-12-16 ENCOUNTER — TELEPHONE (OUTPATIENT)
Dept: MIDWIFE SERVICES | Facility: CLINIC | Age: 32
End: 2022-12-16

## 2022-12-16 VITALS
HEIGHT: 60 IN | SYSTOLIC BLOOD PRESSURE: 127 MMHG | OXYGEN SATURATION: 97 % | HEART RATE: 107 BPM | WEIGHT: 179 LBS | BODY MASS INDEX: 35.14 KG/M2 | DIASTOLIC BLOOD PRESSURE: 62 MMHG

## 2022-12-16 DIAGNOSIS — O09.299 HISTORY OF DELIVERY BY VACUUM EXTRACTION, CURRENTLY PREGNANT: ICD-10-CM

## 2022-12-16 DIAGNOSIS — O26.03 EXCESSIVE WEIGHT GAIN DURING PREGNANCY IN THIRD TRIMESTER: ICD-10-CM

## 2022-12-16 DIAGNOSIS — Z34.80 ENCOUNTER FOR SUPERVISION OF OTHER NORMAL PREGNANCY, UNSPECIFIED TRIMESTER: Primary | ICD-10-CM

## 2022-12-16 DIAGNOSIS — O36.8130 DECREASED FETAL MOVEMENTS IN THIRD TRIMESTER, SINGLE OR UNSPECIFIED FETUS: ICD-10-CM

## 2022-12-16 DIAGNOSIS — O12.03: ICD-10-CM

## 2022-12-16 PROBLEM — Z34.90 ENCOUNTER FOR INDUCTION OF LABOR: Status: ACTIVE | Noted: 2022-12-16

## 2022-12-16 LAB
ALBUMIN MFR UR ELPH: 8.6 MG/DL
ALBUMIN SERPL BCG-MCNC: 3.2 G/DL (ref 3.5–5.2)
ALP SERPL-CCNC: 186 U/L (ref 35–104)
ALT SERPL W P-5'-P-CCNC: 9 U/L (ref 10–35)
ANION GAP SERPL CALCULATED.3IONS-SCNC: 9 MMOL/L (ref 7–15)
AST SERPL W P-5'-P-CCNC: 18 U/L (ref 10–35)
BILIRUB SERPL-MCNC: 0.2 MG/DL
BUN SERPL-MCNC: 7.1 MG/DL (ref 6–20)
CALCIUM SERPL-MCNC: 8.8 MG/DL (ref 8.6–10)
CHLORIDE SERPL-SCNC: 106 MMOL/L (ref 98–107)
CREAT SERPL-MCNC: 0.6 MG/DL (ref 0.51–0.95)
CREAT UR-MCNC: 34.8 MG/DL
DEPRECATED HCO3 PLAS-SCNC: 21 MMOL/L (ref 22–29)
ERYTHROCYTE [DISTWIDTH] IN BLOOD BY AUTOMATED COUNT: 13.4 % (ref 10–15)
GFR SERPL CREATININE-BSD FRML MDRD: >90 ML/MIN/1.73M2
GLUCOSE SERPL-MCNC: 84 MG/DL (ref 70–99)
HCT VFR BLD AUTO: 35.1 % (ref 35–47)
HGB BLD-MCNC: 11.9 G/DL (ref 11.7–15.7)
MCH RBC QN AUTO: 29.8 PG (ref 26.5–33)
MCHC RBC AUTO-ENTMCNC: 33.9 G/DL (ref 31.5–36.5)
MCV RBC AUTO: 88 FL (ref 78–100)
PLATELET # BLD AUTO: 160 10E3/UL (ref 150–450)
POTASSIUM SERPL-SCNC: 4.3 MMOL/L (ref 3.4–5.3)
PROT SERPL-MCNC: 5.8 G/DL (ref 6.4–8.3)
PROT/CREAT 24H UR: 0.25 MG/MG CR (ref 0–0.2)
RBC # BLD AUTO: 3.99 10E6/UL (ref 3.8–5.2)
SODIUM SERPL-SCNC: 136 MMOL/L (ref 136–145)
WBC # BLD AUTO: 11.1 10E3/UL (ref 4–11)

## 2022-12-16 PROCEDURE — 36415 COLL VENOUS BLD VENIPUNCTURE: CPT | Performed by: ADVANCED PRACTICE MIDWIFE

## 2022-12-16 PROCEDURE — 85027 COMPLETE CBC AUTOMATED: CPT | Performed by: ADVANCED PRACTICE MIDWIFE

## 2022-12-16 PROCEDURE — 99207 PR PRENATAL VISIT: CPT | Performed by: ADVANCED PRACTICE MIDWIFE

## 2022-12-16 PROCEDURE — 59426 ANTEPARTUM CARE ONLY: CPT | Performed by: ADVANCED PRACTICE MIDWIFE

## 2022-12-16 PROCEDURE — 80053 COMPREHEN METABOLIC PANEL: CPT | Performed by: ADVANCED PRACTICE MIDWIFE

## 2022-12-16 PROCEDURE — 59025 FETAL NON-STRESS TEST: CPT | Performed by: ADVANCED PRACTICE MIDWIFE

## 2022-12-16 PROCEDURE — 84156 ASSAY OF PROTEIN URINE: CPT | Performed by: ADVANCED PRACTICE MIDWIFE

## 2022-12-16 NOTE — TELEPHONE ENCOUNTER
OB chief for [facility], Dr. Ryan, called back this writer and approved medical Pitocin IOL for persistent decreased fetal movement at term.  Charge nurse at [facility] Bettina reached and schedule patient for 7 PM tonight.  This is agreeable to Charlotte, the patient.  She and her  Girish will plan arrival at 7 PM.  Nurse midwife on-call this evening is Casi Rider, and she will be copied here and notified.

## 2022-12-16 NOTE — PROGRESS NOTES
"Charlotte presents with her daughter Melany.  States fetal movement continues to be decreased with fetus moving in the morning and just before dinner.  Fetal surveillance performed last week was WNL: BPP/NST 10/10.  NST repeated today and reassurin, moderate variability, accelerations present, decelerations absent without uterine contractions.  Considering continued decreased fetal movement at term, this writer recommends IOL, supported by [facility] and Dr. Muir who is the OB chief for [facility].  Patient desires delivery at [facility] which is currently on divert, awaiting approval by Dr. Schilling, OB chief for [facility].  She denies symptoms of labor such as regular uterine contractions, loss of fluid or vaginal bleeding.  She denies visual disturbances/scotomata or right upper quadrant abdominal pain.  Gestational edema continues in lower extremities, hands and around her eyes bilaterally.  She gets headaches, but they are \"mostly\" relieved by fluids, rest and Tylenol 325 mg (2) p.o.  HELLP labs last week were WNL, repeated now: CBC, CMP and urine PC ratio.  Patient is normotensive.  Discussed the use of acetaminophen after p.o. hydration and rest if headache recurs.  Recommend 975 mg p.o. every 6 hours and to contact on-call CNM if acetaminophen does not entirely relieve headache.  Patient is off of work today, Saturday and .  This writer recommends she go home and pack for the hospital.  Her daughter will stay with grandma, and Charlotte and Girish will come to [facility] when given the thumbs up from Dr. Schilling.  All questions answered.  Charlotte has verbal understanding of and agrees with the plan of care.  "

## 2022-12-16 NOTE — PROGRESS NOTES
Pt is calling to see if the Midwife heard from [facility] to see if she can come in for induction? She is trying to set up .

## 2022-12-17 ENCOUNTER — ANESTHESIA (OUTPATIENT)
Dept: OBGYN | Facility: CLINIC | Age: 32
End: 2022-12-17
Payer: COMMERCIAL

## 2022-12-17 ENCOUNTER — ANESTHESIA EVENT (OUTPATIENT)
Dept: OBGYN | Facility: CLINIC | Age: 32
End: 2022-12-17
Payer: COMMERCIAL

## 2022-12-17 PROCEDURE — 250N000009 HC RX 250: Performed by: ANESTHESIOLOGY

## 2022-12-17 PROCEDURE — 250N000011 HC RX IP 250 OP 636: Performed by: ANESTHESIOLOGY

## 2022-12-17 RX ORDER — BUPIVACAINE HYDROCHLORIDE 2.5 MG/ML
INJECTION, SOLUTION EPIDURAL; INFILTRATION; INTRACAUDAL
Status: COMPLETED | OUTPATIENT
Start: 2022-12-17 | End: 2022-12-17

## 2022-12-17 RX ORDER — LIDOCAINE HYDROCHLORIDE AND EPINEPHRINE 15; 5 MG/ML; UG/ML
INJECTION, SOLUTION EPIDURAL PRN
Status: DISCONTINUED | OUTPATIENT
Start: 2022-12-17 | End: 2022-12-18

## 2022-12-17 RX ADMIN — LIDOCAINE HYDROCHLORIDE,EPINEPHRINE BITARTRATE 3 ML: 15; .005 INJECTION, SOLUTION EPIDURAL; INFILTRATION; INTRACAUDAL; PERINEURAL at 16:45

## 2022-12-17 RX ADMIN — BUPIVACAINE HYDROCHLORIDE 5 ML: 2.5 INJECTION, SOLUTION EPIDURAL; INFILTRATION; INTRACAUDAL at 16:49

## 2022-12-17 NOTE — ANESTHESIA PREPROCEDURE EVALUATION
Anesthesia Pre-Procedure Evaluation    Patient: Charlotte Clarke   MRN: [id number] : 1990        Procedure : * No procedures listed *  Induction       Past Medical History:   Diagnosis Date     Depression      Postpartum depression       Past Surgical History:   Procedure Laterality Date     WISDOM TOOTH EXTRACTION        No Known Allergies   Social History     Tobacco Use     Smoking status: Former     Packs/day: 0.20     Years: 10.00     Pack years: 2.00     Types: Cigarettes     Quit date: 2022     Years since quittin.6     Smokeless tobacco: Never   Substance Use Topics     Alcohol use: No      Wt Readings from Last 1 Encounters:   22 81.2 kg (179 lb)        Anesthesia Evaluation            ROS/MED HX  ENT/Pulmonary:  - neg pulmonary ROS     Neurologic:  - neg neurologic ROS     Cardiovascular:  - neg cardiovascular ROS     METS/Exercise Tolerance:     Hematologic:  - neg hematologic  ROS     Musculoskeletal:       GI/Hepatic:  - neg GI/hepatic ROS   (+) GERD,     Renal/Genitourinary:       Endo:  - neg endo ROS     Psychiatric/Substance Use:  - neg psychiatric ROS     Infectious Disease:       Malignancy:       Other:   IOL for decreased fetal movement, maternal edema         Physical Exam    Airway        Mallampati: II       Respiratory Devices and Support         Dental  no notable dental history         Cardiovascular   cardiovascular exam normal          Pulmonary   pulmonary exam normal            Other findings: Physiologic changes of pregnancy    OUTSIDE LABS:  CBC:   Lab Results   Component Value Date    WBC 11.1 (H) 2022    WBC 10.0 2022    HGB 11.9 2022    HGB 12.2 2022    HCT 35.1 2022    HCT 36.3 2022     2022     2022     BMP:   Lab Results   Component Value Date     2022     2022    POTASSIUM 4.3 2022    POTASSIUM 4.4 2022    CHLORIDE 106 2022    CHLORIDE 105 2022     CO2 21 (L) 2022    CO2 21 (L) 2022    BUN 7.1 2022    BUN 8.3 2022    CR 0.60 2022    CR 0.63 2022    GLC 84 2022    GLC 96 2022     COAGS: No results found for: PTT, INR, FIBR  POC:   Lab Results   Component Value Date    HCG Positive (A) 2022     HEPATIC:   Lab Results   Component Value Date    ALBUMIN 3.2 (L) 2022    PROTTOTAL 5.8 (L) 2022    ALT 9 (L) 2022    AST 18 2022    ALKPHOS 186 (H) 2022    BILITOTAL 0.2 2022     OTHER:   Lab Results   Component Value Date    BRYSON 8.8 2022    LIPASE 26 2022    TSH 1.49 2022    CRP 0.5 2022       Anesthesia Plan    ASA Status:  2, emergent       Anesthesia Type: Epidural.              Consents    Anesthesia Plan(s) and associated risks, benefits, and realistic alternatives discussed. Questions answered and patient/representative(s) expressed understanding.    - Discussed:     - Discussed with:  Patient         Postoperative Care    Pain management: Neuraxial analgesia.     - Plan for long acting post-op opioid use   PONV prophylaxis: Ondansetron (or other 5HT-3)     Comments:    Other Comments: Patient requests labor epidural. Chart reviewed, including labs. Reviewed options and risks with the patient, including but not limited to: bleeding, infection, damage to tissues under the skin (nerves, muscles, blood vessels), hypotension, headache, and epidural failure. Questions answered, consent signed. Patient agrees to elective labor epidural.       ADDENDUM: urgent  called for arrest of descent. Pt has been comfortable with epidural. Plan for epidural anesthesia and postop epidural morphine with GETA backup. Risks discussed and questions answered, pt in agreement.       neg OB ROS.       Td Dutta MD

## 2022-12-17 NOTE — ANESTHESIA PROCEDURE NOTES
"Epidural catheter Procedure Note    Pre-Procedure   Staff -        Anesthesiologist:  Td Dutta MD       Performed By: anesthesiologist       Location: OB       Procedure Start/Stop Times: 12/17/2022 4:35 PM and 12/17/2022 4:52 PM       Pre-Anesthestic Checklist: patient identified, IV checked, risks and benefits discussed, informed consent, monitors and equipment checked, pre-op evaluation and at physician/surgeon's request  Timeout:       Correct Patient: Yes        Correct Procedure: Yes        Correct Site: Yes        Correct Position: Yes   Procedure Documentation  Procedure: epidural catheter       Diagnosis: labor pain for anticipated vaginal delivery       Patient Position: sitting       Patient Prep/Sterile Barriers: sterile gloves, mask, patient draped       Skin prep: Chloraprep       Local skin infiltrated with 3 mL of 1% lidocaine.        Insertion Site: L3-4. (midline approach).       Technique: LORT saline        DEE at 5 cm.       Needle Type: [employer]       Needle Gauge: 18.        Needle Length (Inches): 3.5        Catheter: 20 G.          Catheter threaded easily.         6 cm epidural space.         Threaded 11 cm at skin.         # of attempts: 1 and  # of redirects:  0    Assessment/Narrative         Paresthesias: No.       Test dose of 3 mL lidocaine 1.5% w/ 1:200,000 epinephrine at 16:45 CST.         Test dose negative, 3 minutes after injection, for signs of intravascular, subdural, or intrathecal injection.       Insertion/Infusion Method: LORT saline       Aspiration negative for Heme or CSF via Epidural Catheter.    Medication(s) Administered   0.25% Bupivacaine PF (Epidural) - EPIDURAL   5 mL - 12/17/2022 4:49:00 PM  Medication Administration Time: 12/17/2022 4:35 PM     Comments:  Smooth placement on 1st attempt. No apparent complications.       FOR [facility] ([facility]) ONLY:   Pain Team Contact information: please page the Pain Team Via [employer]. Search \"Pain\". During daytime hours, " please page the attending first. At night please page the resident first.

## 2022-12-18 PROBLEM — Z98.891 S/P CESAREAN SECTION: Status: ACTIVE | Noted: 2022-12-18

## 2022-12-18 PROBLEM — O36.8390 FETAL HEART RATE DECELERATIONS AFFECTING MANAGEMENT OF MOTHER: Status: ACTIVE | Noted: 2022-12-18

## 2022-12-18 PROCEDURE — 250N000009 HC RX 250: Performed by: NURSE ANESTHETIST, CERTIFIED REGISTERED

## 2022-12-18 PROCEDURE — 250N000011 HC RX IP 250 OP 636: Performed by: ANESTHESIOLOGY

## 2022-12-18 PROCEDURE — 258N000003 HC RX IP 258 OP 636: Performed by: NURSE ANESTHETIST, CERTIFIED REGISTERED

## 2022-12-18 PROCEDURE — 258N000003 HC RX IP 258 OP 636: Performed by: OBSTETRICS & GYNECOLOGY

## 2022-12-18 PROCEDURE — 250N000011 HC RX IP 250 OP 636: Performed by: OBSTETRICS & GYNECOLOGY

## 2022-12-18 PROCEDURE — 250N000011 HC RX IP 250 OP 636: Performed by: NURSE ANESTHETIST, CERTIFIED REGISTERED

## 2022-12-18 RX ORDER — ONDANSETRON 2 MG/ML
INJECTION INTRAMUSCULAR; INTRAVENOUS PRN
Status: DISCONTINUED | OUTPATIENT
Start: 2022-12-18 | End: 2022-12-18

## 2022-12-18 RX ORDER — DEXAMETHASONE SODIUM PHOSPHATE 4 MG/ML
INJECTION, SOLUTION INTRA-ARTICULAR; INTRALESIONAL; INTRAMUSCULAR; INTRAVENOUS; SOFT TISSUE PRN
Status: DISCONTINUED | OUTPATIENT
Start: 2022-12-18 | End: 2022-12-18

## 2022-12-18 RX ORDER — DIPHENHYDRAMINE HYDROCHLORIDE 50 MG/ML
INJECTION INTRAMUSCULAR; INTRAVENOUS PRN
Status: DISCONTINUED | OUTPATIENT
Start: 2022-12-18 | End: 2022-12-18

## 2022-12-18 RX ORDER — LIDOCAINE HCL/EPINEPHRINE/PF 2%-1:200K
VIAL (ML) INJECTION PRN
Status: DISCONTINUED | OUTPATIENT
Start: 2022-12-18 | End: 2022-12-18

## 2022-12-18 RX ADMIN — LIDOCAINE HYDROCHLORIDE,EPINEPHRINE BITARTRATE 5 ML: 20; .005 INJECTION, SOLUTION EPIDURAL; INFILTRATION; INTRACAUDAL; PERINEURAL at 05:42

## 2022-12-18 RX ADMIN — Medication 2 MG: at 05:55

## 2022-12-18 RX ADMIN — LIDOCAINE HYDROCHLORIDE,EPINEPHRINE BITARTRATE 5 ML: 20; .005 INJECTION, SOLUTION EPIDURAL; INFILTRATION; INTRACAUDAL; PERINEURAL at 05:38

## 2022-12-18 RX ADMIN — LIDOCAINE HYDROCHLORIDE,EPINEPHRINE BITARTRATE 5 ML: 20; .005 INJECTION, SOLUTION EPIDURAL; INFILTRATION; INTRACAUDAL; PERINEURAL at 05:36

## 2022-12-18 RX ADMIN — DEXAMETHASONE SODIUM PHOSPHATE 10 MG: 4 INJECTION, SOLUTION INTRA-ARTICULAR; INTRALESIONAL; INTRAMUSCULAR; INTRAVENOUS; SOFT TISSUE at 05:52

## 2022-12-18 RX ADMIN — LIDOCAINE HYDROCHLORIDE,EPINEPHRINE BITARTRATE 2 ML: 20; .005 INJECTION, SOLUTION EPIDURAL; INFILTRATION; INTRACAUDAL; PERINEURAL at 05:43

## 2022-12-18 RX ADMIN — DIPHENHYDRAMINE HYDROCHLORIDE 12.5 MG: 50 INJECTION, SOLUTION INTRAMUSCULAR; INTRAVENOUS at 05:52

## 2022-12-18 RX ADMIN — Medication 2 G: at 05:43

## 2022-12-18 RX ADMIN — LIDOCAINE HYDROCHLORIDE,EPINEPHRINE BITARTRATE 5 ML: 20; .005 INJECTION, SOLUTION EPIDURAL; INFILTRATION; INTRACAUDAL; PERINEURAL at 05:40

## 2022-12-18 RX ADMIN — PHENYLEPHRINE HYDROCHLORIDE 0.4 MCG/KG/MIN: 10 INJECTION INTRAVENOUS at 05:44

## 2022-12-18 RX ADMIN — ONDANSETRON 4 MG: 2 INJECTION INTRAMUSCULAR; INTRAVENOUS at 05:52

## 2022-12-18 RX ADMIN — SODIUM CHLORIDE, POTASSIUM CHLORIDE, SODIUM LACTATE AND CALCIUM CHLORIDE: 600; 310; 30; 20 INJECTION, SOLUTION INTRAVENOUS at 06:58

## 2022-12-18 RX ADMIN — SODIUM CHLORIDE, POTASSIUM CHLORIDE, SODIUM LACTATE AND CALCIUM CHLORIDE: 600; 310; 30; 20 INJECTION, SOLUTION INTRAVENOUS at 05:35

## 2022-12-18 RX ADMIN — Medication 500 MG: at 05:44

## 2022-12-18 NOTE — ANESTHESIA CARE TRANSFER NOTE
Patient: Charlotte Clarke    Procedure: Procedure(s):   SECTION  Induction    Diagnosis: * No pre-op diagnosis entered *  Diagnosis Additional Information: No value filed.    Anesthesia Type:   Epidural     Note:    Oropharynx: oropharynx clear of all foreign objects  Level of Consciousness: awake  Oxygen Supplementation: room air    Independent Airway: airway patency satisfactory and stable  Dentition: dentition unchanged  Vital Signs Stable: post-procedure vital signs reviewed and stable  Report to RN Given: handoff report given  Patient transferred to: Labor and Delivery    Handoff Report: Identifed the Patient, Identified the Reponsible Provider, Reviewed the pertinent medical history, Discussed the surgical course, Reviewed Intra-OP anesthesia mangement and issues during anesthesia, Set expectations for post-procedure period and Allowed opportunity for questions and acknowledgement of understanding      Vitals:  Vitals Value Taken Time   /62 22 0707   Temp 36.5  C (97.7  F) 22 0707   Pulse 102 22 0707   Resp 16 22 0707   SpO2 100 % 22 0707       Electronically Signed By: AMNSI Sykes CRNA  2022  7:08 AM

## 2022-12-18 NOTE — ANESTHESIA POSTPROCEDURE EVALUATION
Patient: Charlotte Clarke    Procedure: Procedure(s):   SECTION  Induction    Anesthesia Type:  Epidural    Note:  Disposition: Inpatient   Postop Pain Control: Uneventful            Sign Out: Well controlled pain   PONV: No   Neuro/Psych: Uneventful            Sign Out: Acceptable/Baseline neuro status   Airway/Respiratory: Uneventful            Sign Out: Acceptable/Baseline resp. status   CV/Hemodynamics: Uneventful            Sign Out: Acceptable CV status; No obvious hypovolemia; No obvious fluid overload   Other NRE: NONE   DID A NON-ROUTINE EVENT OCCUR? No    Event details/Postop Comments:    Charlotte Clarke status post labor epidural for  delivery. Satisfied with anesthetic care.    /69   Pulse 102   Temp 36.6  C (97.8  F) (Oral)   Resp 16   LMP 2022 (Exact Date)   SpO2 94%   Breastfeeding Unknown       Denies headache. Neurologically intact. No apparent anesthetic complications. No follow up indicated.          Epidural-to- Updated ASA: 2 Emergent      Last vitals:  Vitals:    22 0841 22 0857 22 0859   BP: 119/68 129/69    Pulse:      Resp:      Temp:      SpO2:   94%       Electronically Signed By: Td Dutta MD  2022  9:57 AM

## 2022-12-19 PROBLEM — O98.512 COVID-19 AFFECTING PREGNANCY IN SECOND TRIMESTER: Status: RESOLVED | Noted: 2022-09-16 | Resolved: 2022-12-19

## 2022-12-19 PROBLEM — O12.03: Status: RESOLVED | Noted: 2022-12-09 | Resolved: 2022-12-19

## 2022-12-19 PROBLEM — Z34.90 ENCOUNTER FOR INDUCTION OF LABOR: Status: RESOLVED | Noted: 2022-12-16 | Resolved: 2022-12-19

## 2022-12-19 PROBLEM — O36.8130 DECREASED FETAL MOVEMENTS IN THIRD TRIMESTER, SINGLE OR UNSPECIFIED FETUS: Status: RESOLVED | Noted: 2022-12-09 | Resolved: 2022-12-19

## 2022-12-19 PROBLEM — O09.299 HISTORY OF DELIVERY BY VACUUM EXTRACTION, CURRENTLY PREGNANT: Status: RESOLVED | Noted: 2022-09-16 | Resolved: 2022-12-19

## 2022-12-19 PROBLEM — K21.9 GASTROESOPHAGEAL REFLUX DISEASE WITHOUT ESOPHAGITIS: Status: RESOLVED | Noted: 2022-11-11 | Resolved: 2022-12-19

## 2022-12-19 PROBLEM — O26.03 EXCESSIVE WEIGHT GAIN DURING PREGNANCY IN THIRD TRIMESTER: Status: RESOLVED | Noted: 2022-08-12 | Resolved: 2022-12-19

## 2022-12-19 PROBLEM — O36.8390 FETAL HEART RATE DECELERATIONS AFFECTING MANAGEMENT OF MOTHER: Status: RESOLVED | Noted: 2022-12-18 | Resolved: 2022-12-19

## 2022-12-19 PROBLEM — U07.1 COVID-19 AFFECTING PREGNANCY IN SECOND TRIMESTER: Status: RESOLVED | Noted: 2022-09-16 | Resolved: 2022-12-19

## 2022-12-21 ENCOUNTER — TELEPHONE (OUTPATIENT)
Dept: MIDWIFE SERVICES | Facility: CLINIC | Age: 32
End: 2022-12-21

## 2022-12-21 NOTE — TELEPHONE ENCOUNTER
OB Follow Up Phone Call    :  N/A    Language:  English    Discharge Follow-Up:  Follow-Up call by Outreach nurse: Call placed    Type of Delivery:      Feeding Method:  breastfeeding    Feedings in 24Hrs:  Feeding atleast every 3 hours    Breast engorgement:   No    Nipple tenderness:  No    Non-nursing engorgement discussed:  N/A    Amount of Feeding:  n/a    Number of Infant Stools in 24 hours:  3 so far today    Stool:  Brown soft    Number of Infant voids in 24 hours:  3 so far today    Urine:  clear    Infant Jaundice:  Face only    Discussed increasing s/s of Jaundice:  Yes

## 2022-12-28 ENCOUNTER — OFFICE VISIT (OUTPATIENT)
Dept: INTERNAL MEDICINE | Facility: CLINIC | Age: 32
End: 2022-12-28
Payer: COMMERCIAL

## 2022-12-28 VITALS
HEART RATE: 99 BPM | SYSTOLIC BLOOD PRESSURE: 108 MMHG | TEMPERATURE: 97.9 F | OXYGEN SATURATION: 96 % | BODY MASS INDEX: 30 KG/M2 | RESPIRATION RATE: 18 BRPM | HEIGHT: 62 IN | DIASTOLIC BLOOD PRESSURE: 72 MMHG | WEIGHT: 163 LBS

## 2022-12-28 DIAGNOSIS — H69.92 DYSFUNCTION OF LEFT EUSTACHIAN TUBE: Primary | ICD-10-CM

## 2022-12-28 PROCEDURE — 99213 OFFICE O/P EST LOW 20 MIN: CPT | Performed by: NURSE PRACTITIONER

## 2022-12-28 RX ORDER — FLUTICASONE PROPIONATE 50 MCG
1 SPRAY, SUSPENSION (ML) NASAL DAILY
Qty: 16 G | Refills: 0 | Status: SHIPPED | OUTPATIENT
Start: 2022-12-28 | End: 2023-02-24

## 2022-12-28 ASSESSMENT — PAIN SCALES - GENERAL: PAINLEVEL: EXTREME PAIN (8)

## 2022-12-28 NOTE — PROGRESS NOTES
"  Assessment & Plan   Problem List Items Addressed This Visit    None  Visit Diagnoses     Dysfunction of left eustachian tube    -  Primary    Relevant Medications    fluticasone (FLONASE) 50 MCG/ACT nasal spray         - Reviewed diagnosis and that likely symptoms will resolve with time. She will also START: fluticasone nasal spray. She is advised to follow up if she develops increased pain, fever, drainage, chills, or symptoms persist/worsen in the next 2-3 weeks.          BMI:   Estimated body mass index is 29.81 kg/m  as calculated from the following:    Height as of this encounter: 1.575 m (5' 2\").    Weight as of this encounter: 73.9 kg (163 lb).       No follow-ups on file.    Bettina Pena NP  [facility] MIGDALIA Porter is a 32 year old, presenting for the following health issues:  Ear Problem (L ear started hurting on 2022 the day she left the hospital from giving birth. R ear just began hurting today. )    When she left the hospital after her baby's delivery by  on  she noticed left ear pain. It is muffled sounding \"like I am in a tunnel\". No drainage, fever, chills.     History of Present Illness       Reason for visit:  Ear pain  Symptom onset:  3-7 days ago  Symptoms include:  Ear pain  Symptom intensity:  Moderate  Symptom progression:  Staying the same  Had these symptoms before:  No  What makes it worse:  ?  What makes it better:  ?    She eats 2-3 servings of fruits and vegetables daily.She consumes 2 sweetened beverage(s) daily.She exercises with enough effort to increase her heart rate 30 to 60 minutes per day.  She exercises with enough effort to increase her heart rate 3 or less days per week.   She is taking medications regularly.           Objective    /72 (BP Location: Right arm, Patient Position: Sitting, Cuff Size: Adult Regular)   Pulse 99   Temp 97.9  F (36.6  C) (Oral)   Resp 18   Ht 1.575 m (5' 2\")   Wt 73.9 kg (163 lb)   LMP " 03/28/2022 (Exact Date)   SpO2 96%   Breastfeeding No   BMI 29.81 kg/m    Body mass index is 29.81 kg/m .     Wt Readings from Last 5 Encounters:   12/28/22 73.9 kg (163 lb)   12/16/22 81.2 kg (179 lb)   12/09/22 77.6 kg (171 lb)   12/02/22 76.2 kg (168 lb)   11/28/22 76.2 kg (168 lb)         Physical Exam   GENERAL: healthy, alert and no distress  EYES: Eyes grossly normal to inspection, conjunctivae and sclerae normal  HENT: ear canals normal. Left TM is retracted with normal visualization of TM landmarks   NECK: no adenopathy, no asymmetry  RESP: lungs clear to auscultation - no rales, rhonchi or wheezes  CV: regular rate and rhythm

## 2023-01-09 ENCOUNTER — OFFICE VISIT (OUTPATIENT)
Dept: MIDWIFE SERVICES | Facility: CLINIC | Age: 33
End: 2023-01-09
Payer: COMMERCIAL

## 2023-01-09 VITALS
HEART RATE: 76 BPM | SYSTOLIC BLOOD PRESSURE: 126 MMHG | WEIGHT: 160 LBS | DIASTOLIC BLOOD PRESSURE: 84 MMHG | BODY MASS INDEX: 29.26 KG/M2

## 2023-01-09 DIAGNOSIS — R20.0 BILATERAL FINGER NUMBNESS: ICD-10-CM

## 2023-01-09 DIAGNOSIS — Z98.891 S/P CESAREAN SECTION: ICD-10-CM

## 2023-01-09 DIAGNOSIS — Z30.8 ENCOUNTER FOR OTHER CONTRACEPTIVE MANAGEMENT: ICD-10-CM

## 2023-01-09 PROCEDURE — 99024 POSTOP FOLLOW-UP VISIT: CPT | Performed by: ADVANCED PRACTICE MIDWIFE

## 2023-01-09 ASSESSMENT — PATIENT HEALTH QUESTIONNAIRE - PHQ9: 5. POOR APPETITE OR OVEREATING: NOT AT ALL

## 2023-01-09 ASSESSMENT — ANXIETY QUESTIONNAIRES
5. BEING SO RESTLESS THAT IT IS HARD TO SIT STILL: NOT AT ALL
2. NOT BEING ABLE TO STOP OR CONTROL WORRYING: NOT AT ALL
GAD7 TOTAL SCORE: 0
6. BECOMING EASILY ANNOYED OR IRRITABLE: NOT AT ALL
1. FEELING NERVOUS, ANXIOUS, OR ON EDGE: NOT AT ALL
GAD7 TOTAL SCORE: 0
7. FEELING AFRAID AS IF SOMETHING AWFUL MIGHT HAPPEN: NOT AT ALL
3. WORRYING TOO MUCH ABOUT DIFFERENT THINGS: NOT AT ALL

## 2023-01-09 NOTE — PATIENT INSTRUCTIONS
POSTPARTUM INFORMATION AND RESOURCES:     Congratulations on the birth of your baby. We have gathered together some information on staying healthy in the postpartum time including information on exercise, nutrition and mental health. We have also listed some local and national resources for lactation support and mental health support.     If you have questions or concerns and need to speak with a midwife immediately, please call the on-call midwife at [phone].     If you have a non-emergent question or would like to schedule a follow up appointment, please call the clinic midwife at [phone].     Thank you for sharing your birth experience with the [facility] [place] [facility].  Congratulations on the birth of your baby!     ---------------------------------------------------------------------------------------------------------  EXERCISE & NUTRITION:      Getting and Staying Active: A Healthy You!   -Why should I exercise? Exercise, being physically active, is very important for all women. Being active can help you:   Lose or maintain weight   Have more energy   Sleep better   Enjoy sex more   Relieve stress and think better   Lower the chance you will have heart disease, high blood pressure, and diabetes   Strengthen your bones and muscles   Have fewer hot flashes if you are older   -How active do I have to be to get the bene?ts of exercise?  Studies show that as little as 15 minutes of moderate exercise--like fast walking or dancing--3 times a week can improve the health of your heart. Ifyou want to get the best benefits from exercise, try to increase your physical activity to at least 30 minutes, 5 times a week. If you have a serious health problem,be sure to talk with your health care provider before starting an exercise program.   Https://Healthcentrix/  Http://www.ThriveHive/     @[email or web address]  @MyPelvicFloorMuscles  [email or web address].[email or web address].[email or web address]     Taking Care of your Health:  Health Care Maintenance Screening recommendations   In all the things women do, taking care of everything and everybody else, they sometimes forget to take care of themselves. This handout reviews the health screenings and vaccines that are recommended for women of all ages. Talk with yourhealth care provider about which tests and vaccines you need. The chart below lists the screening tests and vaccinations recommended for healthy women who do not have a high risk for most diseases.   The recommendations in thischart are guidelines only. For some tests and vaccines, the chart says you should talk to your health care provider.This is because the best recommendations for you depend on your personal health history. Your health care provider may suggest more frequent testing or additional tests ifyou havea higher chance of getting some diseases      Planning Your Family: Developing a Reproductive Life Plan   Planning ahead can help you avoid getting pregnant when you don t want to be pregnant and also be in good health if and when you do decide to become pregnant. Many women have at least one pregnancy in their lives, even if it was not planned. In fact, about half of all pregnancies are not planned. Getting pregnant when you did not plan it can be a problem, or it can turn into a happy event. Planning pregnancy leads to healthier pregnancies, healthier mothers, and healthier families.   Although many women want to have a family, not everyone wants to have children. More and more women are childless by choice (also known as childfree). Whether to have children is a personal choice that only you can make. It s okay not to want children! If you never want to get pregnant, it is important to make sure you always use very effective birth control, such as an intrauterine device, the birth control implant, female sterilization (having your tubes tied), or your partner having a vasectomy.      Reliable resources:   ?  "  American College of Nurse-Midwives (ACNM) http://www.midwife.org/; look at the informational handouts at http://www.midwife.org/Share-With-Women   ??   Women's Health.gov:   http://www.womenshealth.gov/a-z-topics/index.html   FDA - Nutrition ?www.mypyramid.gov? Under \"For Consumers,\" click on \"pregnant and breastfeeding women.\"   ?   Vaccines : Centers for Disease Control and Prevention (CDC) http://www.cdc.gov/vaccines/   ?   [facility] www.International Gaming League.com   ?   When researching information on the web, question the validity of websites.? The [employer] .Earnest, Dacheng Network andSKINNYpriceorg tend to be more reliable information.? If there are a lot of advertisements, be cautious of the information provided. Stay away from blogs and chat rooms please!   ?   ?   Nutrition and supplements:   Daily multivitamin vitamin (with 400 - 1000 mcg folic acid).? Take with food as needed.   ?   4-5 servings of dairy or other calcium rich foods (fish, leafy greens, soy)?per day - if not, take 500-1000 mg additional calcium (Tums, pills, chews). Take with dairy.   ?   Vitamin D3 4000 IU geltab daily. Vitamin D rich foods: Cod Liver Oil (1Tbsp), [place], Mackerel, Tuna, fortified milk and yogurt, Beef and liver, sardines, egg, fortified cereals, swiss cheese.? Take supplements with fattiest meal.?   ?   2-3 (4) oz servings of fish, seafood, nuts (walnuts & almonds), oils, avocado per week - if not, take Omega 3 Fatty acids: DHA & LOLI 3955-9216 mg per day. ?Other names: cod liver oil, fish oil. Take with fattiest meal.   ?   ?---------------------------------------------------------------------------------------------------------  POSTPARTUM & LACTATION RESOURCES :         [facility] [place] [address] provides a free, drop-in class/breastfeeding support group, facilitated by a lactation consultant and .  During the group you can connect with other parents, weigh your baby, ask questions about feeding and baby development, " "and more.  You do not need to register.   Fall in-person meetings will begin on , are for parents of babies from birth to 9 months, and will meet on Monday evenings from 6 - 7:15 pm in [facility], which is at [address] [facility].  [place] [address] also offers virtual group meetings with a lactation consultant/.  These take place on , from 11:30 am - 12:30 pm for parents of newborns to 3-month-olds, and from 4:15 - 5:15 for parents of 3 - 9 - month olds.  To get the meeting link contact Amanda Borjas at [phone].    [facility] offers a free, drop-in breastfeeding support group facilitated by ONEL Garnica.   at [facility] [address], [place].  A scale is available to check baby weights, if desired.  [facility] also has a variety of new parent classes:  (cost for registration)  https://Sitedesk/classes/    [facility] Lactation unge facilitated by ONEL Rivera:  Free, drop-in support group for breastfeeding, with baby scale available if needed.  Meets at [facility], second Tuesday of each month, 10 am - 12 pm.  Text [phone] for info.    [facility] Cafe Support Group,  at 10:30 am   Run by ONEL Lopez of [employer] Baby Wh[employer]perer Lactation Consultants   Go to [facility] Lactation Consultants Facebook page, click on \"events\" for link:   Https://www.M[employer]ments.com/events/[email or web address]/    [facility] breastfeeding support community:  free, drop-in breastfeeding support facilitated by Certified Lactation Counselors, open  and  from 1 pm - 5 pm.  In [place]:  [facility] [place], [address] [facility]:   guidingalfonsota.org    [facility] Milk Hour,  at 2:30 pm    Run by ONEL Watkins  Go to [facility] FB page and send message to get " "link   Https://www.ttwick.com/healthfoundations/    LaLecmena Cevallos:  http://www.londas.org/    [facility] offers a Lactation Lounge every Friday 12pm - 1pm, run by Jeri Taylor, Tarah Cevallos Leader.  Sign up via link at OpenPlacement/cbe-lactation   https://www.OpenPlacement/cbe-lactation    [facility] is offering virtual support groups every Monday, 10:30 am - 12 pm, run by RN IBCLC: Https://www.ttwick.com/events/[email or web address]/    Culturally-Specific Breastfeeding Support:     For Hmong Families:   The Hmong Breastfeeding Coalition is a wonderful support for [place] Hmong women who are breastfeeding.  They are best found on Facebook.    for Black families:    Bluwancolate Milk Club:  http://www.ArtCorgi/chocolate-milk-club/  Email: Angie@[employer]    R.O.S.E. = Reaching our Sisters Everywhere  Http://www.breastfeedingrose.org/    Club Mom breastfeeding support for Black mothers:  Contact Ioana Davison  Phone: [phone]   Email:  [email or web address].     Mai Vora  Phone: [phone]   Email:  [email or web address].    Club Dad parent support for Black fathers:   Contact Gonzalez Elias   Phone: [phone]   Email:  [email or web address].    For Native/Indigenous Families:    https://www.ttwick.com/groups/nitamising.gimashkikinaan        OUTPATIENT LACTATION RESOURCES   -Schedule an appointment with a [facility] midwife who is also a Lactation Consultant by calling [phone].  Visits on Tuesdays, Wednesdays and Thursdays at multiple locations.  Call: [phone].       - Information on medication use while breastfeeding: http://toxnet.nlm.nih.gov/newtoxnet/lactmed.htm      Other Online Breastfeeding Resources:   Breastfeedingmadesimple.Giftiki   Llli.org (La Leche League)   Normalfed.com   Womenshealth.gov/breastfeeding   Workandpump.com   [employer].Giftiki  https://[facility].Giftiki/abcs/   Click on \"Learn More " "About Attachment\"     -New Parent Connection Drop-In:  In collaboration with Karin, Early Childhood Family Education (ECFE), a program of [address], offers ongoing classes for new parents in their infants.  The connection classes offer support and resources to parents during the exciting and challenging period of transition following the birth of her baby.  Parents and babies (birth to 6 months of age) may join the group at any time.  Baby's birth to 3 months meet , from 1230 to 1:30 PM  Babies 3-6 months meet , from 4 to 5 PM     -EnhanceWorksa Group: www.Tekmi/free-new-mama-group  -Attend Gar[employer]elera Innovationsa. Groups located at three locations:  [facility] and [place]. Sign up online.         Additional Resources:?   -American College of Nurse-Midwives (ACNM) http://www.midwife.org/; look at the informational handouts at http://www.midwife.org/Share-With-Women  www.mymidwife.org   ?   -Women's Health.gov:   http://www.womenshealth.gov/a-z-topics/index.html   ?   -Early Childhood and Family Education (ECFE):   ECFE offers parents hands-on learning experiences that will nourish a lifetime of teachable moments.   http://ecfe.info/ecfe-home/         -----------------------------------------------------------------------------------------------------------   (Before, during and after pregnancy)   MOOD DISORDER RESOURCES :  Are you feeling sad or depressed?   Do you feel more irritable or angry with those around you?   Are you having difficulty bonding with your baby?   Do you feel anxious or panicky?   Are you having problems with eating or sleeping?   Are you having upsetting thoughts that you can t get out of your mind?   Do you feel as if you are  out of control  or  going crazy ?   Do you feel like you never should have become a mother?   Are you worried that you might hurt your baby or yourself?     Any of these symptoms, and many " more, could indicate that you have a form of  mood or anxiety disorder, such as postpartum depression. While many women experience some mild mood changes during or after the birth of a child, 15 to 20% of women experience more significant symptoms of depression or anxiety. Please know that with informed care you can prevent a worsening of these symptoms and can fully recover. There is no reason to continue to suffer.  Women of every culture, age, income level and race can develop  mood and anxiety disorders. Symptoms can appear any time during pregnancy and the first 12 months after childbirth. There are effective and well-researched treatment options to help you recover. Although the term  postpartum depression  is most often used, there are actually several forms of illness that women may experience.     Resources:     -Pregnancy and Postpartum Support [place]: www.[facility].org  Who We Are: We are a group of mental health &  practitioners, service organizations, and mother volunteers who provides services to those struggling with a pregnancy, loss, or postpartum mood disorder through the Helpline, professional training, our resource list and website.  What We Do: We provide support, advocacy, awareness, and training about  mental health in [place].      Community Resource List:   This is a list of  resources within our community.   http://[facility] Medical Centerupportmn.org/communityresourcelist    PSYCHOTHERAPISTS/CONSULTANTS   This is a list of licensed mental health professionals who have advanced clinical skills in the treatment of postpartum mood and anxiety disorders (PMADs).   Http://ppsupportmn.org/mentalhealthproviderresourcelist   INTEGRATIVE MEDICINE PRACTITIONERS:   This is a list of licensed providers who practice Integrative Medicine: a form of treatment that combines alternative medicine with evidence based medicine in an effort to treat the  whole person   "(the person, not just the disease).   http://[facility] Hospitalupportmn.org/integrativemedicineproviders    PSYCHIATRIC CARE   This is a list of licensed Psychiatrists who have advanced clinical skills in the treatment and medication management for PMADs and lactating mothers.   Http://[facility].org/psychiatryproviderresroucelist   Click on the links below to find detailed profiles and contact information of providers who have been screened and approved as having advanced training in the area of PMADs. Please note: [facility] does not endorse a specific provider.   The list is in alphabetical order by city. You can also search for providers by city or zip code using the search box. Please click on the \"Show\" box to the upper right to advance to the next page. You may also call our Helpline at [phone] if you would like for our Helpline volunteer to find a provider for you.     -Postpartum Depression Support Group:   Weekly groups at no cost through [facility], , 1:30-3:00 p.m., at [facility], [address] [place] [address], [address]. [place], , 1:30-3:00 p.m., at [facility], [address]. To register for the group or get more information, call [phone].   -Postpartum Depression Support Group:   Outpatient Intensive Treatment program for women with  mood disorders [facility]     -[facility]  Resource Guide      Women s Mental Health at [facility]  This website provides a range of current information including discussion of new research findings in women s mental health and how such investigations inform day-to-day clinical practice. Despite the growing number of studies being conducted in women s health, the clinical implications of such work are frequently controversial, leaving patients with questions regarding the most appropriate path to follow. Providing " these resources to patients and their doctors so that individual clinical decisions can be made in a thoughtful and collaborative fashion dovetails with the mission of our Center.     https://womensmentalhealth.org/        If you re having thoughts of harming yourself or your baby, it is vital to get support immediately. Call 911 or go to the nearest E.R.     TOLL-FREE / NATIONWIDE EMERGENCY ASSISTANCE   Immediate Emergency:  911   National Suicide Prevention Lifeline:   9-423-809-9368   Suicide Prevention Hotline:   3-151-DRPPZPE   [place] Postpartum Depression Hotline:   1-800-PPD-MOMS   Postpartum Support International (PSI)   PPD Helpline: (not a 24-hour hotline)   1-868.785.6848

## 2023-01-09 NOTE — PROGRESS NOTES
Assessment:   Charlotte Clarke is a 32 year old female here for postpartum follow up at 2 weeks postpartum  s/p  section  Contraceptive counseling- Mirena IUD   Numbness of fingertips bilaterally  History of postpartum depression    Plan:   1.  Discussed normal regression of edema and postpartum.  Encouraged continuing to adequately hydrate.  Consider referral at 6-week postpartum visit with persistent numbness of fingers  2.  IUD consultation performed.  Discussed timing of insertion at 6-week postpartum visit or after if desired.  Encouraged ensuring adequate time during visit if IUD insertion is desired.  Reviewed importance of no unprotected intercourse for 2 weeks prior to IUD insertion.  3.  Follow-up with surgeon for incision check as scheduled next week.  4.  Continue postpartum cares at home.  Follow-up at 6 weeks postpartum for routine visit or sooner as needed.    20  minutes spent on the date of the encounter doing chart review, review of test results, patient visit and documentation     Irene Nevarez, MANSI, CNM, IBCLC    Subjective:      Charlotte Clarke is a 32 year old female who presents for a postpartum follow up visit. She is 2 weeks postpartum following a  section. I have fully reviewed the prenatal and intrapartum course. The delivery was at 37 gestational weeks. Outcome: primary  section, low transverse incision. The amount and color of the lochia is appropriate for the duration of recovery. The patient feels well. The baby is well and being fed by bottle. Voiding without difficulty, lochia normal, tolerating normal diet, and passing flatus.  Pain is well controlled with current medications.     The patient has no emotional concerns.  NUPUR-7:0.  postpartum depression screening score: 0.  She does have a history of postpartum depression but states she is feeling like her normal self at this time.  Feels well supported by her partner.    Plans Mirena IUD for  postpartum birth control.  They feel active family is complete.    The following portions of the patient's history were reviewed and updated as appropriate: allergies, current medications and problem list    Review of Systems  General:  Denies problem  Eyes: Denies problem  Ears/Nose/Throat: Denies problem  Cardiovascular: Denies problem  Respiratory:  Denies problem  Gastrointestinal:  Denies problem, Genitourinary: Denies problem  Musculoskeletal:  Denies problem  Skin: Denies problem  Neurologic: Denies problem  Psychiatric: Denies problem  Endocrine: Denies problem  Heme/Lymphatic: Denies problem   Allergic/Immunologic: Denies problem        Objective:      Vitals:    23 1348   BP: 126/84   Pulse: 76   Weight: 72.6 kg (160 lb)     Physical Exam:  General: Alert and oriented x3, appropriate, pleasant  Abdomen: Fundus 3 fingerbreadths above the pubic bone.   scar well approximated, clean, dry, intact.  No redness or edema.

## 2023-01-27 ENCOUNTER — OFFICE VISIT (OUTPATIENT)
Dept: MIDWIFE SERVICES | Facility: CLINIC | Age: 33
End: 2023-01-27
Payer: COMMERCIAL

## 2023-01-27 ENCOUNTER — PRENATAL OFFICE VISIT (OUTPATIENT)
Dept: MIDWIFE SERVICES | Facility: CLINIC | Age: 33
End: 2023-01-27
Payer: COMMERCIAL

## 2023-01-27 VITALS — SYSTOLIC BLOOD PRESSURE: 108 MMHG | WEIGHT: 153 LBS | BODY MASS INDEX: 27.98 KG/M2 | DIASTOLIC BLOOD PRESSURE: 66 MMHG

## 2023-01-27 DIAGNOSIS — Z01.812 PRE-PROCEDURE LAB EXAM: ICD-10-CM

## 2023-01-27 DIAGNOSIS — Z30.8 ENCOUNTER FOR OTHER CONTRACEPTIVE MANAGEMENT: ICD-10-CM

## 2023-01-27 DIAGNOSIS — Z86.59 HISTORY OF POSTPARTUM DEPRESSION: ICD-10-CM

## 2023-01-27 DIAGNOSIS — Z87.59 HISTORY OF POSTPARTUM DEPRESSION: ICD-10-CM

## 2023-01-27 DIAGNOSIS — Z98.891 S/P CESAREAN SECTION: ICD-10-CM

## 2023-01-27 DIAGNOSIS — Z30.430 ENCOUNTER FOR INSERTION OF INTRAUTERINE CONTRACEPTIVE DEVICE: Primary | ICD-10-CM

## 2023-01-27 PROCEDURE — 58300 INSERT INTRAUTERINE DEVICE: CPT | Performed by: ADVANCED PRACTICE MIDWIFE

## 2023-01-27 PROCEDURE — 99207 PR POST PARTUM EXAM: CPT | Performed by: ADVANCED PRACTICE MIDWIFE

## 2023-01-28 PROBLEM — R20.0 BILATERAL FINGER NUMBNESS: Status: RESOLVED | Noted: 2023-01-09 | Resolved: 2023-01-28

## 2023-01-28 PROBLEM — Z34.80 ENCOUNTER FOR SUPERVISION OF OTHER NORMAL PREGNANCY, UNSPECIFIED TRIMESTER: Status: RESOLVED | Noted: 2022-06-22 | Resolved: 2023-01-28

## 2023-01-28 PROBLEM — Z98.891 S/P CESAREAN SECTION: Status: RESOLVED | Noted: 2022-12-18 | Resolved: 2023-01-28

## 2023-01-29 NOTE — PROGRESS NOTES
IUD Insertion:  CONSULT:    Is a pregnancy test required: No.  Was a consent obtained?  Yes    Subjective: Charlotte Clarke is a 32 year old  presents for IUD and desires Mirena type IUD.    Patient has been given the opportunity to ask questions about all forms of birth control, including all options appropriate for Charlotte Clarke. Discussed that no method of birth control, except abstinence is 100% effective against pregnancy or sexually transmitted infection.     Charlotte Clarke understands she may have the IUD removed at any time. IUD should be removed by a health care provider.    The entire insertion procedure was reviewed with the patient, including care after placement.    Patient's last menstrual period was 2023 (exact date). Last sexual activity: Prior to birth of child. No allergy to betadine or shellfish. Patient declines STD screening      LMP 2023 (Exact Date)     Pelvic Exam:   EG/BUS: normal genital architecture without lesions, erythema or abnormal secretions.   Vagina: moist, pink, rugae with physiologic discharge and secretions  Cervix: parous no lesions and pink, moist, closed, without lesion or CMT  Uterus: Retroverted position, mobile, no pain  Adnexa: within normal limits and no masses, nodularity, tenderness    PROCEDURE NOTE: -- IUD Insertion    Reason for Insertion: contraception    Premedicated with ibuprofen.  Under sterile technique, cervix was visualized with speculum and prepped with Betadine solution swab x 3. Tenaculum was placed for stability. The uterus was gently straightened and sounded to 8.0 cm. IUD prepared for placement, and IUD inserted according to 's instructions without difficulty or significant resitance, and deployed at the fundus. The strings were visualized and trimmed to 3.0 cm from the external os. Tenaculum was removed and hemostasis noted. Speculum removed.  Patient tolerated procedure well.    Lot #[id number]  Exp: October  2024    EBL: minimal    Complications: none    ASSESSMENT:     ICD-10-CM    1. Encounter for insertion of intrauterine contraceptive device  Z30.430 levonorgestrel (MIRENA) 20 MCG/DAY IUD     levonorgestrel (MIRENA) 20 MCG/DAY IUD 20 mcg     INSERTION INTRAUTERINE DEVICE           PLAN:    Given 's handouts, including when to have IUD removed, list of danger s/sx, side effects and follow up recommended. Encouraged condom use for prevention of STD. Back up contraception advised for 7 days if progestin method. Advised to call for any fever, for prolonged or severe pain or bleeding, abnormal vaginal discharge, or unable to palpate strings. She was advised to use pain medications (ibuprofen) as needed for mild to moderate pain. Advised to follow-up in clinic in 4-6 weeks for IUD string check if unable to find strings or as directed by provider.     Leda Moore CNM

## 2023-01-29 NOTE — PROGRESS NOTES
6-week Postpartum Visit:     Assessment:   1.  6 weeks postpartum, status post primary  birth  2.  Formula feeding  3.  History of postpartum depression, stable today  4.  Contraceptive management-desires Mirena IUD    Plan:   - Reviewed warning signs of postpartum depression. [place]  Mental Health Resource List given for future reference prn.   -PP exercises reviewed and encouraged modified abdominal crunches and Kegels daily. Encouraged integrating formal exercise, such as walking 20-30mns daily.   -Warning signs of breast infections of mastitis and thrush reviewed. Breastfeeding support resource list and contact info given, including [facility] and [facility].   -Encouraged to continue with PNV, Vitamin D and DHA supplements.   - Return of fertility discussed. Plans Mirena IUD for contraception.   -Reviewed warning signs of pelvic pain, excessive bleeding or abdominal pain, fever/chills, or signs of breast infection.   -Labs today: Urine pregnancy test  -RTC for routine health maintenance or sooner as needed.     TT with patient 40 mns, >50% time spent in counseling or coordination of care.     Subjective:   Charlotte is a 31yo, here for her 6 week postpartum exam. She is integrating birth experience/care and transition well. Bleeding and uterine cramping have ceased. Denies pain. Reports normal bowel and bladder functioning. EPDS score 0/30. Reports good family/friend support.  Formula feeding.  Will return to work in 6 weeks.   Has not resumed intercourse. Denies pain or concerns. Plans to use Mirena IUD for contraception.     Review of Systems  Pertinent items are noted in HPI.      Objective:     Physical Exam:  General: Pleasant, articulate, well-groomed, well-nourished female.  Not in any apparent distress.  Neck: Supple.  Thyroid: Small, symmetrical, no nodules noted.  Lymphadenopathy: Negative.  Lungs: Clear to auscultation bilaterally, and a  nonlabored breathing pattern.  Cardio: Regular rate and rhythm, negative for murmur.   Breasts: Deferred  Abdomen: Soft, nontender, no masses, negative CVAT.  External genitalia: Normal hair distribution, no lesions.  Urethral opening: Without lesions, or tenderness.   Bladder: Without masses, or tenderness.  Vagina: Pink, rugated, normal-appearing discharge.  Cervix: pink, smooth, no lesions.  Pap smear not obtained.   Bimanual: Finds uterus small, mobile, nontender, no masses.  Negative CMT.  Adnexa, without masses or tenderness.    Lower extremities: +1 reflexes, no significant edema.

## 2023-02-23 DIAGNOSIS — H69.92 DYSFUNCTION OF LEFT EUSTACHIAN TUBE: ICD-10-CM

## 2023-02-24 ENCOUNTER — OFFICE VISIT (OUTPATIENT)
Dept: MIDWIFE SERVICES | Facility: CLINIC | Age: 33
End: 2023-02-24
Payer: COMMERCIAL

## 2023-02-24 VITALS
WEIGHT: 148.5 LBS | DIASTOLIC BLOOD PRESSURE: 52 MMHG | SYSTOLIC BLOOD PRESSURE: 104 MMHG | BODY MASS INDEX: 27.16 KG/M2 | HEART RATE: 60 BPM

## 2023-02-24 DIAGNOSIS — Z30.431 IUD CHECK UP: ICD-10-CM

## 2023-02-24 DIAGNOSIS — N93.9 ABNORMAL UTERINE BLEEDING (AUB): Primary | ICD-10-CM

## 2023-02-24 PROCEDURE — 99213 OFFICE O/P EST LOW 20 MIN: CPT | Performed by: ADVANCED PRACTICE MIDWIFE

## 2023-02-24 RX ORDER — IBUPROFEN 800 MG/1
800 TABLET, FILM COATED ORAL 3 TIMES DAILY
Qty: 21 TABLET | Refills: 0 | Status: SHIPPED | OUTPATIENT
Start: 2023-02-24 | End: 2023-03-03

## 2023-02-24 NOTE — PROGRESS NOTES
Assessment:   1.  IUD check.  2.  Abnormal uterine bleeding likely related to Mirena IUD     Plan:   -Discussed danger signs and symptoms of the IUD including how to check for strings. Instructed patient to check her strings monthly. Discussed when/where to call with any fever, severe back pain, severe abdominal pain, heavy bleeding (soaking more than 1 pad per hour). Also encouraged to call if she does not feel her strings. She was advised to use Ibuprofen as needed for mild to moderate pain.   -Ibuprofen 800 mg p.o. 3 times daily, #21, no refills.  Encouraged regular ibuprofen dosing for 1 week in order to affect abnormal uterine bleeding.  If ineffective, consider 1 to 2 months of combined oral contraceptive pills.  -Mirena IUD should be removed by January 27, 2031    TT with patient 15 mns >50% time spent in counseling or coordination of care.     Subjective:   Charlotte Clarke is a 32 year old female who presents for IUD check. She had a Mirena inserted on 1/27/2023. Reports no complaints since insertion. Bleeding has been constant and moderate. Minimal cramping, well-controlled with Ibuprofen.     Review of Systems  Pertinent items are noted in HPI.      Objective:     Physical Exam:  General: Pleasant, articulate, well-groomed, well-nourished female.  Not in any apparent distress.  Abdomen: Soft, nontender, no masses palpated, negative CVAT.  External genitalia: Normal hair distribution, no lesions.  Urethral opening: Without lesions or discharge. No tenderness.   Bladder: Without masses, or tenderness.  Vagina: Pink, rugated, normal-appearing discharge.  Cervix: parous, pink, smooth, no lesions. IUD strings visualized and 3 cm in length.   Bimanual: small, mobile, nontender, no masses.  Negative CMT.  Adnexa, without masses or tenderness.

## 2023-02-24 NOTE — TELEPHONE ENCOUNTER
"Routing refill request to provider for review/approval because:  Medication is reported as not taking    Last Written Prescription Date:  12/28/22  Last Fill Quantity: 16g,  # refills: 0   Last office visit provider:   12/28/22 with  IM; 2/24/32 with CNM    Requested Prescriptions   Pending Prescriptions Disp Refills     fluticasone (FLONASE) 50 MCG/ACT nasal spray [Pharmacy Med Name: FLUTICASONE 50MCG NASAL SP (120) RX] 16 g 0     Sig: SHAKE LIQUID AND USE 1 SPRAY IN EACH NOSTRIL DAILY       Nasal Allergy Protocol Passed - 2/24/2023  1:13 PM        Passed - Patient is age 12 or older        Passed - Recent (12 mo) or future (30 days) visit within the authorizing provider's specialty     Patient has had an office visit with the authorizing provider or a provider within the authorizing providers department within the previous 12 mos or has a future within next 30 days. See \"Patient Info\" tab in inbasket, or \"Choose Columns\" in Meds & Orders section of the refill encounter.              Passed - Medication is active on med list             GUILHERME ESCOBAR RN 02/24/23 1:16 PM  "

## 2023-02-27 RX ORDER — FLUTICASONE PROPIONATE 50 MCG
SPRAY, SUSPENSION (ML) NASAL
Qty: 16 G | Refills: 3 | Status: SHIPPED | OUTPATIENT
Start: 2023-02-27 | End: 2023-04-24

## 2023-03-22 DIAGNOSIS — Z30.431 IUD CHECK UP: Primary | ICD-10-CM

## 2023-03-24 ENCOUNTER — HOSPITAL ENCOUNTER (OUTPATIENT)
Dept: ULTRASOUND IMAGING | Facility: HOSPITAL | Age: 33
Discharge: HOME | End: 2023-03-24
Attending: ADVANCED PRACTICE MIDWIFE | Admitting: ADVANCED PRACTICE MIDWIFE
Payer: COMMERCIAL

## 2023-03-24 DIAGNOSIS — Z30.431 IUD CHECK UP: ICD-10-CM

## 2023-03-24 PROCEDURE — 76830 TRANSVAGINAL US NON-OB: CPT

## 2023-04-24 ENCOUNTER — NURSE TRIAGE (OUTPATIENT)
Dept: NURSING | Facility: CLINIC | Age: 33
End: 2023-04-24

## 2023-04-24 ENCOUNTER — OFFICE VISIT (OUTPATIENT)
Dept: MIDWIFE SERVICES | Facility: CLINIC | Age: 33
End: 2023-04-24
Payer: COMMERCIAL

## 2023-04-24 VITALS
DIASTOLIC BLOOD PRESSURE: 66 MMHG | HEART RATE: 84 BPM | BODY MASS INDEX: 24.48 KG/M2 | HEIGHT: 62 IN | OXYGEN SATURATION: 99 % | SYSTOLIC BLOOD PRESSURE: 122 MMHG | WEIGHT: 133 LBS

## 2023-04-24 DIAGNOSIS — N92.6 IRREGULAR MENSTRUAL BLEEDING: Primary | ICD-10-CM

## 2023-04-24 DIAGNOSIS — R10.33 PERIUMBILICAL ABDOMINAL PAIN: ICD-10-CM

## 2023-04-24 PROCEDURE — 99214 OFFICE O/P EST MOD 30 MIN: CPT | Performed by: MIDWIFE

## 2023-04-24 RX ORDER — NORGESTIMATE AND ETHINYL ESTRADIOL 7DAYSX3 LO
1 KIT ORAL DAILY
Qty: 28 TABLET | Refills: 1 | Status: SHIPPED | OUTPATIENT
Start: 2023-04-24 | End: 2023-07-05

## 2023-04-24 NOTE — TELEPHONE ENCOUNTER
"Charlotte returned call back at 0900, HUSEYIN returned page/call.  Charlotte reports constant spotting and frequent heavier bleeding since IUD placement. Clots this morning, see nurse triage notes below.  \"This is my third IUD and I have never had a problem with bleeding before\".  Had U/S 3/24 with the result \"appropriately positioned intrauterine device\".  4 months postpartum.  Dicussed options: Would like evaluation today in clinic and possibly replace IUD.  Assisted with scheduling with Midwife Grazyna Guevara at 1300.    MANSI Rowell CNM   4/24/2023 9:14 AM       Attempted to call patient.  Unable to reach.   left to call back via [phone].    MANSI Rowell CNM   4/24/2023 8:55 AM       "

## 2023-04-24 NOTE — TELEPHONE ENCOUNTER
Clinic Action Needed: Yes, please contact patient via phone or MyChart. See FNA triage encounter below. Pt triaged to be seen within 4 hours, clinic opens in 2 hours.     [facility]NA Triage Call    [facility] [place]    Lia Zamorano RN  [place] Nurse Advisor  4/24/2023 4:57 AM

## 2023-04-24 NOTE — TELEPHONE ENCOUNTER
Nurse Triage SBAR    Situation:   Vaginal bleeding + clot  Abdominal pain    Background:   Has Mirena IUD, placed 2023   2022    Assessment:   Pt reports intermittent vaginal bleeding since Mirena placement, bleeding for days at a time. She never goes a full week without bleeding. Pt changing her pad every 5 hours, sometimes needs to wear a pad and tampon.     Last night she passed a dark red (almost black) colored clot, larger than a quarter in size. Clot was white at the bottom. This clot is what prompted pt to call this morning.     She's also having low abdominal cramping, constant mild pain. Pain becomes severe after eating dinner.     She denies a fever, dizziness.     Protocol Recommended Disposition:   See a provider within 4 hours. RN will route to [facility] team to address. RN advised pt to call FNA back with any new or worsening symptoms.   Patient verbalized understanding and had no further questions.      Lia Zamorano RN  [place] Nurse Advisor  2023 4:56 AM       Reason for Disposition    [1] Constant abdominal pain AND [2] present > 2 hours    Additional Information    Negative: Shock suspected (e.g., cold/pale/clammy skin, too weak to stand, low BP, rapid pulse)    Negative: Sounds like a life-threatening emergency to the triager    Negative: [1] Abdominal pain AND [2] pregnant < 20 weeks    Negative: [1] Vaginal bleeding AND [2] pregnant < 20 weeks    Negative: Vaginal discharge is main symptom    Negative: [1] SEVERE abdominal pain (e.g., excruciating) AND [2] present > 1 hour    Negative: SEVERE vaginal bleeding (e.g., soaking 2 pads or tampons per hour and present 2 or more hours; 1 menstrual cup every 2 hours)    Negative: SEVERE dizziness (e.g., unable to stand, requires support to walk, feels like passing out now)    Negative: Patient sounds very sick or weak to the triager    Negative: MODERATE vaginal bleeding (e.g., soaking 1 pad or tampon per hour and  present > 6 hours; 1 menstrual cup every 6 hours)    Negative: [1] Vaginal bleeding is WORSE than normal (e.g., heavier) AND [2] dizziness or lightheadedness    Protocols used: CONTRACEPTION - IUD SYMPTOMS AND [id number]

## 2023-04-24 NOTE — PROGRESS NOTES
"Assessment:     1. Irregular menstrual bleeding  2. Periumbilical abdominal pain     Plan:      1. Ortho-tri-cyclen Lo x2 months to control breakthrough bleeding  2. Recommended increasing fluids, small frequent meals, stool softener for abdominal pain. If symptoms persist, recommend follow-up with PCP or GI in one month  3. RTC if bleeding continues or PRN    Subjective:     Charlotte Clarke is a 32 year old female who presents for breakthrough bleeding with Mirena IUD x3 months. She reports that she bled daily for first two months, then every other week. 2-3 pads/tampons per day. Bleeding starts and stops suddenly throughout the day. Mucousy clot the size of a quarter passed today.     Also reporting stomach pain - after  has been having postprandial pain after dinner only. Severe cramping, aching for 2-3 hours, soreness constant. Below umbilicus, above pelvis. Having regular bowel movements. Denies nausea/vomiting. Unchanged with ambulation. Tums, pepto don't help. No dyspareunia or dysuria.     ROS:  Negative unless noted above    Objective:        Vitals:    23 1253   BP: 122/66   Pulse: 84   SpO2: 99%   Weight: 60.3 kg (133 lb)   Height: 1.575 m (5' 2\")       Physical Exam:  General Appearance: Alert, cooperative, no distress, appears stated age  Skin: Skin color, texture, turgor normal, no rashes or lesions      Patient was seen with student, BRICE Harkins who was present for learning. I personally assessed, examined and made clinical decisions reflected in the documentation.       "

## 2023-06-16 DIAGNOSIS — N92.6 IRREGULAR MENSTRUAL BLEEDING: ICD-10-CM

## 2023-07-05 RX ORDER — NORGESTIMATE AND ETHINYL ESTRADIOL 7DAYSX3 LO
KIT ORAL
Qty: 28 TABLET | Refills: 1 | Status: SHIPPED | OUTPATIENT
Start: 2023-07-05 | End: 2023-12-15

## 2023-07-05 NOTE — TELEPHONE ENCOUNTER
[facility]. [facility]ication requested is Tri-Lo Sveta, take 1 tablet by mouth daily. Last visit with CNM was 04/24/2023. Refill prepped for CNM review.

## 2023-08-05 ENCOUNTER — HEALTH MAINTENANCE LETTER (OUTPATIENT)
Age: 33
End: 2023-08-05

## 2023-10-06 ENCOUNTER — OFFICE VISIT (OUTPATIENT)
Dept: MIDWIFE SERVICES | Facility: CLINIC | Age: 33
End: 2023-10-06
Payer: COMMERCIAL

## 2023-10-06 VITALS
BODY MASS INDEX: 22.78 KG/M2 | WEIGHT: 116 LBS | SYSTOLIC BLOOD PRESSURE: 100 MMHG | HEIGHT: 60 IN | DIASTOLIC BLOOD PRESSURE: 60 MMHG

## 2023-10-06 DIAGNOSIS — Z30.8 ENCOUNTER FOR OTHER CONTRACEPTIVE MANAGEMENT: ICD-10-CM

## 2023-10-06 DIAGNOSIS — Z30.432 ENCOUNTER FOR REMOVAL OF INTRAUTERINE CONTRACEPTIVE DEVICE: Primary | ICD-10-CM

## 2023-10-06 PROCEDURE — 58301 REMOVE INTRAUTERINE DEVICE: CPT | Performed by: ADVANCED PRACTICE MIDWIFE

## 2023-10-06 PROCEDURE — 99213 OFFICE O/P EST LOW 20 MIN: CPT | Mod: 25 | Performed by: ADVANCED PRACTICE MIDWIFE

## 2023-10-06 RX ORDER — LEVONORGESTREL / ETHINYL ESTRADIOL AND ETHINYL ESTRADIOL 150-30(84)
1 KIT ORAL DAILY
Qty: 91 TABLET | Refills: 3 | Status: ON HOLD | OUTPATIENT
Start: 2023-10-06 | End: 2024-09-27

## 2023-10-06 ASSESSMENT — ANXIETY QUESTIONNAIRES
3. WORRYING TOO MUCH ABOUT DIFFERENT THINGS: NOT AT ALL
6. BECOMING EASILY ANNOYED OR IRRITABLE: NOT AT ALL
5. BEING SO RESTLESS THAT IT IS HARD TO SIT STILL: NOT AT ALL
GAD7 TOTAL SCORE: 0
2. NOT BEING ABLE TO STOP OR CONTROL WORRYING: NOT AT ALL
GAD7 TOTAL SCORE: 0
IF YOU CHECKED OFF ANY PROBLEMS ON THIS QUESTIONNAIRE, HOW DIFFICULT HAVE THESE PROBLEMS MADE IT FOR YOU TO DO YOUR WORK, TAKE CARE OF THINGS AT HOME, OR GET ALONG WITH OTHER PEOPLE: NOT DIFFICULT AT ALL
1. FEELING NERVOUS, ANXIOUS, OR ON EDGE: NOT AT ALL
7. FEELING AFRAID AS IF SOMETHING AWFUL MIGHT HAPPEN: NOT AT ALL
4. TROUBLE RELAXING: NOT AT ALL

## 2023-10-06 NOTE — PROGRESS NOTES
"IUD Removal:  SUBJECTIVE:    Is a pregnancy test required: No.  Was a consent obtained?  Yes, verbal    Charlotte Clarke is a 33 year old female,, No LMP recorded. (Menstrual status: IUD). who presents today for IUD removal. Her current IUD was placed 2023. She has had problems including continuous spotting  with the IUD. She requests removal of the IUD because of problems stated above.    She underwent a pelvic ultrasound to ensure that the IUD was in an appropriate position which it was.    She then trialed a couple of months of combined oral contraceptive pills which improved the spotting for a short period of time but resumed while still on the combined oral contraceptive pills.    Last month, she only had 4 days free of vaginal spotting.  It is impacting her sex life.  \"My  and I have only had sex 1 time since our little girl (the youngest) was born.\"    Today's PHQ-2 Score:       2023     4:09 PM   PHQ-2 (  [employer])   Q1: Little interest or pleasure in doing things 0   Q2: Feeling down, depressed or hopeless 0   PHQ-2 Score 0       PROCEDURE:    A speculum exam was performed and the cervix was visualized. The IUD string was visualized. Using ring forceps, the string  was grasped and the IUD removed intact.    POST PROCEDURE:    The patient tolerated the procedure well. Patient was discharged in stable condition.    Call if bleeding, pain or fever occur. and Birth control counseling given.    Prescription for Seasonique given, #91, 3 refills. ACHES danger signs and symptoms reviewed.  Follow-up for blood pressure check when RTC for annual well woman exam.    Leda Moore CNM    Study Result    Narrative & Impression   EXAM: US PELVIC TRANSABDOMINAL AND TRANSVAGINAL  LOCATION: [facility]  DATE/TIME: 3/24/2023 2:42 PM     INDICATION: IUD position check.  Abnormal uterine bleeding.  COMPARISON: None.  TECHNIQUE: Transabdominal scans were performed. " Endovaginal ultrasound was performed to better visualize the adnexa.     FINDINGS:     UTERUS: 7.8 x 3.7 x 5.3 cm. Retroverted. No fibroids. A  section scar is present in the anterior lower uterine segment.     ENDOMETRIUM: Intrauterine device is appropriately positioned within the endometrial canal and somewhat obscures evaluation of the endometrium. Visualized portion of the endometrium is normal in thickness, measuring 4 mm. Trace fluid is present within the   endometrial canal.     RIGHT OVARY: 3.5 x 2.1 x 1.6 cm. Normal.     LEFT OVARY: 3.5 x 1.6 x 1.9 cm. Normal.     No significant free fluid.                                                                      IMPRESSION:     1.  Appropriately positioned intrauterine device.   Answers submitted by the patient for this visit:  NUPUR-7 (Submitted on 10/6/2023)  NUPUR 7 TOTAL SCORE: 0

## 2023-12-15 ENCOUNTER — OFFICE VISIT (OUTPATIENT)
Dept: FAMILY MEDICINE | Facility: CLINIC | Age: 33
End: 2023-12-15
Payer: COMMERCIAL

## 2023-12-15 VITALS
HEIGHT: 61 IN | OXYGEN SATURATION: 100 % | HEART RATE: 98 BPM | SYSTOLIC BLOOD PRESSURE: 104 MMHG | TEMPERATURE: 98.8 F | RESPIRATION RATE: 14 BRPM | WEIGHT: 115 LBS | DIASTOLIC BLOOD PRESSURE: 62 MMHG | BODY MASS INDEX: 21.71 KG/M2

## 2023-12-15 DIAGNOSIS — Z80.8 FAMILY HISTORY OF BRAIN CANCER: ICD-10-CM

## 2023-12-15 DIAGNOSIS — N94.10 DYSPAREUNIA IN FEMALE: ICD-10-CM

## 2023-12-15 DIAGNOSIS — Z00.00 ROUTINE GENERAL MEDICAL EXAMINATION AT A HEALTH CARE FACILITY: Primary | ICD-10-CM

## 2023-12-15 PROCEDURE — 99213 OFFICE O/P EST LOW 20 MIN: CPT | Mod: 25

## 2023-12-15 PROCEDURE — 99395 PREV VISIT EST AGE 18-39: CPT

## 2023-12-15 ASSESSMENT — ENCOUNTER SYMPTOMS
PALPITATIONS: 0
FEVER: 0
CHILLS: 0
PARESTHESIAS: 0
CONSTIPATION: 0
DIZZINESS: 0
WEAKNESS: 0
MYALGIAS: 0
HEMATOCHEZIA: 0
ARTHRALGIAS: 0
SHORTNESS OF BREATH: 0
EYE PAIN: 0
HEADACHES: 0
DYSURIA: 0
SORE THROAT: 0
JOINT SWELLING: 0
HEARTBURN: 0
HEMATURIA: 0
ABDOMINAL PAIN: 0
BREAST MASS: 0
FREQUENCY: 0
NERVOUS/ANXIOUS: 0
NAUSEA: 0
DIARRHEA: 0
COUGH: 0

## 2023-12-15 ASSESSMENT — PAIN SCALES - GENERAL: PAINLEVEL: NO PAIN (0)

## 2023-12-15 NOTE — PROGRESS NOTES
SUBJECTIVE:   Charlotte is a 33 year old, presenting for the following:  Establish Care        12/15/2023     1:59 PM   Additional Questions   Roomed by erendira     With the Mirena she was bleeding for 9 months. She has had positive experiences in the past with Mirena. The plan at this time is to tyy the pill and maybe go back to Mirena and see if the bleeding improves.      Emergency . This was their second delivery.     Mom's brother  from a brain tumor. Tumor burst. Less than 50, he was in his 30s at diagnosis.  She has a lot of anxiety related to this. She had an episode where they fainted prior to their first born. They have always had a bump on the back of their head. It has always been there. The other side there is nothing. It has always been like. Not changing or growing but she is worried it could be a brain tumor.     Pain in the stomach has always been there since the . Did an US and that was normal. She went to the surgeon and it was normal. Having sex hurts. More with the deep penetration it hurts. In certain positions it won't hurt. The mirena has been out for three months. She never had this pain before. They did do a balloon to try and help her along during labor, this was very painful and she wonders if this contributed.     Healthy Habits:     Getting at least 3 servings of Calcium per day:  Yes    Bi-annual eye exam:  NO    Dental care twice a year:  Yes    Sleep apnea or symptoms of sleep apnea:  None    Diet:  Regular (no restrictions)    Frequency of exercise:  4-5 days/week    Duration of exercise:  Greater than 60 minutes    Taking medications regularly:  Yes    Medication side effects:  None    Additional concerns today:  No    Have you ever done Advance Care Planning? (For example, a Health Directive, POLST, or a discussion with a medical provider or your loved ones about your wishes): No, advance care planning information given to patient to review.  Patient declined  advance care planning discussion at this time.    Social History     Tobacco Use    Smoking status: Former     Packs/day: 0.20     Years: 10.00     Additional pack years: 0.00     Total pack years: 2.00     Types: Cigarettes     Quit date: 2022     Years since quittin.6     Passive exposure: Never    Smokeless tobacco: Never   Substance Use Topics    Alcohol use: No             12/15/2023     1:44 PM   Alcohol Use   Prescreen: >3 drinks/day or >7 drinks/week? Not Applicable     Reviewed orders with patient.  Reviewed health maintenance and updated orders accordingly - Yes    Breast Cancer Screenin/15/2023     1:45 PM   Breast CA Risk Assessment (FHS-7)   Do you have a family history of breast, colon, or ovarian cancer? No / Unknown       Pertinent mammograms are reviewed under the imaging tab.    History of abnormal Pap smear: NO - age 30- 65 PAP every 3 years recommended      2022    12:13 PM 2018     3:22 PM   PAP / HPV   PAP Negative for Intraepithelial Lesion or Malignancy (NILM)  Negative for squamous intraepithelial lesion or malignancy  Electronically signed by Lele Hernandez CT (ASCP) on 2018 at  4:28 PM        Reviewed and updated as needed this visit by clinical staff   Tobacco  Allergies  Meds              Reviewed and updated as needed this visit by Provider                   Review of Systems   Constitutional:  Negative for chills and fever.   HENT:  Negative for congestion, ear pain, hearing loss and sore throat.    Eyes:  Negative for pain and visual disturbance.   Respiratory:  Negative for cough and shortness of breath.    Cardiovascular:  Negative for chest pain, palpitations and peripheral edema.   Gastrointestinal:  Negative for abdominal pain, constipation, diarrhea, heartburn, hematochezia and nausea.   Breasts:  Negative for tenderness, breast mass and discharge.   Genitourinary:  Negative for dysuria, frequency, genital sores, hematuria, pelvic  "pain, urgency, vaginal bleeding and vaginal discharge.   Musculoskeletal:  Negative for arthralgias, joint swelling and myalgias.   Skin:  Negative for rash.   Neurological:  Negative for dizziness, weakness, headaches and paresthesias.   Psychiatric/Behavioral:  Negative for mood changes. The patient is not nervous/anxious.      OBJECTIVE:   /62 (BP Location: Right arm, Patient Position: Sitting)   Pulse 98   Temp 98.8  F (37.1  C) (Oral)   Resp 14   Ht 1.56 m (5' 1.42\")   Wt 52.2 kg (115 lb)   LMP 09/15/2023 (Approximate)   SpO2 100%   BMI 21.43 kg/m    Physical Exam  Vitals reviewed.   Constitutional:       General: She is not in acute distress.  HENT:      Head: Normocephalic.      Comments: Bilateral, bony protrusion to the base of the skull.      Right Ear: Tympanic membrane, ear canal and external ear normal.      Left Ear: Tympanic membrane, ear canal and external ear normal.   Eyes:      Extraocular Movements: Extraocular movements intact.      Pupils: Pupils are equal, round, and reactive to light.   Cardiovascular:      Rate and Rhythm: Normal rate and regular rhythm.      Pulses: Normal pulses.      Heart sounds: Normal heart sounds. No murmur heard.  Pulmonary:      Effort: Pulmonary effort is normal. No respiratory distress.      Breath sounds: No wheezing.   Abdominal:      General: Abdomen is flat. Bowel sounds are normal. There is no distension.   Musculoskeletal:         General: Normal range of motion.      Cervical back: Normal range of motion. No rigidity.      Right lower leg: No edema.      Left lower leg: No edema.   Lymphadenopathy:      Cervical: No cervical adenopathy.   Neurological:      General: No focal deficit present.      Mental Status: She is alert.      Cranial Nerves: No cranial nerve deficit.      Sensory: No sensory deficit.      Motor: No weakness.      Gait: Gait normal.   Psychiatric:         Mood and Affect: Mood normal.         Thought Content: Thought " content normal.       ASSESSMENT/PLAN:   Routine general medical examination at a health care facility  Family history of brain cancer  On exam, healthy 33 year old patient. No acute or concerning findings on today's physical exam. Vital signs at goal. Concern on the skull is appreciated bilaterally. Normal occipital bone protrusion suspected. No neurological symptoms per patient and no neurologic abnormalities on physical exam. Discussed s/s or brain tumors and reassured patient of benign finding on the skull. If she can get anymore details on her uncle and his health status/diagnosis she will update us so we can put it in her medical records as well as make any further recommendations if needed.   Declines flu and COVID vaccines today.     Dyspareunia in female  Reports painful intercourse since . Has had transvaginal US, seen OBGYN, and seen her surgeon who all have not found anything related to the delivery that would likely be contributing. I discussed dyspareunia with patient the the psychological components we can see with this as well as the changes that happen to the pelvis with child birth. Recommend pelvic floor PT as well as talk therapy. Patient declined talk therapy at this time but is very interested in PT. Referral placed. If symptoms persist beyond PT or if the PT has any concerns or insights during sessions patient will reach out.   - Physical Therapy Referral; Future     COUNSELING:  Reviewed preventive health counseling, as reflected in patient instructions       Regular exercise       Healthy diet/nutrition       Contraception       Safe sex practices/STD prevention    She reports that she quit smoking about 19 months ago. Her smoking use included cigarettes. She has a 2.00 pack-year smoking history. She has never been exposed to tobacco smoke. She has never used smokeless tobacco.    At the end of the visit, I confirmed understanding of what was discussed. Charlotte has no further  questions or concerns that were brought up at this time.     10 minutes spent in addition to the preventative visit by me on the date of the encounter doing chart review, history and exam, documentation and further activities per the note    Rachel Portillo DNP, APRN, FNP-C

## 2024-03-08 ENCOUNTER — OFFICE VISIT (OUTPATIENT)
Dept: FAMILY MEDICINE | Facility: CLINIC | Age: 34
End: 2024-03-08
Payer: COMMERCIAL

## 2024-03-08 VITALS
DIASTOLIC BLOOD PRESSURE: 60 MMHG | TEMPERATURE: 98.7 F | HEIGHT: 61 IN | HEART RATE: 88 BPM | SYSTOLIC BLOOD PRESSURE: 100 MMHG | WEIGHT: 117.3 LBS | BODY MASS INDEX: 22.15 KG/M2 | OXYGEN SATURATION: 99 % | RESPIRATION RATE: 15 BRPM

## 2024-03-08 DIAGNOSIS — N92.1 IRREGULAR INTERMENSTRUAL BLEEDING: Primary | ICD-10-CM

## 2024-03-08 PROBLEM — Z30.8 ENCOUNTER FOR OTHER CONTRACEPTIVE MANAGEMENT: Status: RESOLVED | Noted: 2022-12-19 | Resolved: 2024-03-08

## 2024-03-08 LAB
ERYTHROCYTE [DISTWIDTH] IN BLOOD BY AUTOMATED COUNT: 12.9 % (ref 10–15)
HCT VFR BLD AUTO: 41.4 % (ref 35–47)
HGB BLD-MCNC: 13.7 G/DL (ref 11.7–15.7)
MCH RBC QN AUTO: 29.7 PG (ref 26.5–33)
MCHC RBC AUTO-ENTMCNC: 33.1 G/DL (ref 31.5–36.5)
MCV RBC AUTO: 90 FL (ref 78–100)
PLATELET # BLD AUTO: 255 10E3/UL (ref 150–450)
RBC # BLD AUTO: 4.61 10E6/UL (ref 3.8–5.2)
WBC # BLD AUTO: 7.8 10E3/UL (ref 4–11)

## 2024-03-08 PROCEDURE — 83002 ASSAY OF GONADOTROPIN (LH): CPT | Performed by: FAMILY MEDICINE

## 2024-03-08 PROCEDURE — 83001 ASSAY OF GONADOTROPIN (FSH): CPT | Performed by: FAMILY MEDICINE

## 2024-03-08 PROCEDURE — 84443 ASSAY THYROID STIM HORMONE: CPT | Performed by: FAMILY MEDICINE

## 2024-03-08 PROCEDURE — 82627 DEHYDROEPIANDROSTERONE: CPT | Performed by: FAMILY MEDICINE

## 2024-03-08 PROCEDURE — 84146 ASSAY OF PROLACTIN: CPT | Performed by: FAMILY MEDICINE

## 2024-03-08 PROCEDURE — 84270 ASSAY OF SEX HORMONE GLOBUL: CPT | Performed by: FAMILY MEDICINE

## 2024-03-08 PROCEDURE — 82670 ASSAY OF TOTAL ESTRADIOL: CPT | Performed by: FAMILY MEDICINE

## 2024-03-08 PROCEDURE — 36415 COLL VENOUS BLD VENIPUNCTURE: CPT | Performed by: FAMILY MEDICINE

## 2024-03-08 PROCEDURE — 84403 ASSAY OF TOTAL TESTOSTERONE: CPT | Performed by: FAMILY MEDICINE

## 2024-03-08 PROCEDURE — 85027 COMPLETE CBC AUTOMATED: CPT | Performed by: FAMILY MEDICINE

## 2024-03-08 PROCEDURE — 99213 OFFICE O/P EST LOW 20 MIN: CPT | Performed by: FAMILY MEDICINE

## 2024-03-08 ASSESSMENT — PATIENT HEALTH QUESTIONNAIRE - PHQ9
SUM OF ALL RESPONSES TO PHQ QUESTIONS 1-9: 0
SUM OF ALL RESPONSES TO PHQ QUESTIONS 1-9: 0
10. IF YOU CHECKED OFF ANY PROBLEMS, HOW DIFFICULT HAVE THESE PROBLEMS MADE IT FOR YOU TO DO YOUR WORK, TAKE CARE OF THINGS AT HOME, OR GET ALONG WITH OTHER PEOPLE: NOT DIFFICULT AT ALL

## 2024-03-08 ASSESSMENT — PAIN SCALES - GENERAL: PAINLEVEL: NO PAIN (0)

## 2024-03-08 NOTE — PROGRESS NOTES
Assessment/Plan:    Irregular intermenstrual bleeding  Irregular menstrual bleeding since delivery of baby 14 months ago - mostly light spotting/brown blood, no heavy bleeding/clots - occasional cramping that is pretty significant. Recommend evaluation with labs as below; discussed prolactin and hormones may be difficult to interpret given time of day and on OCPs. Pt does have apt with OBGYN next week and will have US that day. Discussed could consider retrying mirena IUD vs switching to alternative combined OCP.   - TSH with free T4 reflex  - Prolactin  - CBC with platelets  - DHEA sulfate  - Testosterone Free and Total  - Luteinizing Hormone  - Follicle stimulating hormone  - Estradiol       Follow up: pending test results    Emse Sanford MD  [facility]    Subjective:   Charlotte Clarke is a 33 year old female is here today for irregular vaginal bleeding    -c/s 12/28/22 - 2nd baby - induction for decreased fetal movement/decels in clinic, got to 9 cm and then arrest of dilation - was OP, 6 lb 11 oz - 37 wks  -bleeding since then  -had mirena IUD put in 6 weeks postpartum - didn't stop bleeding - US was normal  -IUD removed and started on OCPs, worked for 1 month   -changes pad once per day - color varies but most often dark brown - doesn't really get periods  -historically periods were normal  -no vaginal discharge noted  -doesn't have cramping - pretty bad - used to be more cyclic but now more like once every 2-3 weeks - more on L side/pelvic  -hx dyspareunia   -has apt next week with OB surgeon - Dr Moran    Answers submitted by the patient for this visit:  Patient Health Questionnaire (Submitted on 3/8/2024)  If you checked off any problems, how difficult have these problems made it for you to do your work, take care of things at home, or get along with other people?: Not difficult at all  PHQ9 TOTAL SCORE: 0  General Questionnaire (Submitted on 3/8/2024)  Chief Complaint: Chronic problems  general questions HPI Form  What is the reason for your visit today? : bleeding almost unstop since my c section  How many servings of fruits and vegetables do you eat daily?: 2-3  On average, how many sweetened beverages do you drink each day (Examples: soda, juice, sweet tea, etc.  Do NOT count diet or artificially sweetened beverages)?: 2  How many minutes a day do you exercise enough to make your heart beat faster?: 30 to 60  How many days a week do you exercise enough to make your heart beat faster?: 4  How many days per week do you miss taking your medication?: 0    Patient Active Problem List   Diagnosis    History of postpartum depression     Past Medical History:   Diagnosis Date    Depression     Postpartum depression     Only with first pregnancy.     Past Surgical History:   Procedure Laterality Date     SECTION N/A 2022    Procedure:  SECTION;  Surgeon: Jazmyne Moran MD;  Location: [facility] OR    WISDOM TOOTH EXTRACTION       Current Outpatient Medications   Medication    levonorgest-eth estrad 91-Day (SEASONIQUE) 0.15-0.03 &0.01 MG tablet     No current facility-administered medications for this visit.     No Known Allergies  Social History     Socioeconomic History    Marital status: Single     Spouse name: Girish    Number of children: 2    Years of education: Not on file    Highest education level: High school graduate   Occupational History    Not on file   Tobacco Use    Smoking status: Former     Types: Cigarettes     Quit date: 2022     Years since quittin.8     Passive exposure: Never    Smokeless tobacco: Never   Vaping Use    Vaping Use: Never used   Substance and Sexual Activity    Alcohol use: No    Drug use: No    Sexual activity: Yes     Partners: Male     Birth control/protection: OCP   Other Topics Concern    Not on file   Social History Narrative    Not on file     Social Determinants of Health     Financial Resource Strain: Low Risk   "(12/15/2023)    Financial Resource Strain     Within the past 12 months, have you or your family members you live with been unable to get utilities (heat, electricity) when it was really needed?: No   Food Insecurity: Low Risk  (12/15/2023)    Food Insecurity     Within the past 12 months, did you worry that your food would run out before you got money to buy more?: No     Within the past 12 months, did the food you bought just not last and you didn t have money to get more?: No   Transportation Needs: Low Risk  (12/15/2023)    Transportation Needs     Within the past 12 months, has lack of transportation kept you from medical appointments, getting your medicines, non-medical meetings or appointments, work, or from getting things that you need?: No   Physical Activity: Not on file   Stress: Not on file   Social Connections: Not on file   Interpersonal Safety: Low Risk  (3/8/2024)    Interpersonal Safety     Do you feel physically and emotionally safe where you currently live?: Yes     Within the past 12 months, have you been hit, slapped, kicked or otherwise physically hurt by someone?: No     Within the past 12 months, have you been humiliated or emotionally abused in other ways by your partner or ex-partner?: No   Housing Stability: Low Risk  (12/15/2023)    Housing Stability     Do you have housing? : Yes     Are you worried about losing your housing?: No     Family History   Problem Relation Age of Onset    No Known Problems Mother     No Known Problems Father     No Known Problems Sister     No Known Problems Sister     No Known Problems Brother     Diabetes Maternal Grandmother     Hypertension Maternal Grandmother      Review of systems is as stated in HPI, and the remainder of system review is otherwise negative.    Objective:     /60   Pulse 88   Temp 98.7  F (37.1  C) (Temporal)   Resp 15   Ht 1.549 m (5' 1\")   Wt 53.2 kg (117 lb 4.8 oz)   SpO2 99%   BMI 22.16 kg/m      General appearance: " awake, NAD  HEENT: atraumatic, normocephalic, no scleral icterus or injection  Lungs: breathing comfortably on room air  Neuro: alert, oriented x3, CNs grossly intact, no focal deficits appreciated  Psych: normal mood/affect/behavior, answering questions appropriately, linear thought process

## 2024-03-09 LAB
ESTRADIOL SERPL-MCNC: <5 PG/ML
FSH SERPL IRP2-ACNC: 1.5 MIU/ML
LH SERPL-ACNC: <0.3 MIU/ML
PROLACTIN SERPL 3RD IS-MCNC: 4 NG/ML (ref 5–23)
SHBG SERPL-SCNC: 55 NMOL/L (ref 30–135)
TSH SERPL DL<=0.005 MIU/L-ACNC: 1.14 UIU/ML (ref 0.3–4.2)

## 2024-03-11 LAB — DHEA-S SERPL-MCNC: 84 UG/DL (ref 35–430)

## 2024-03-13 LAB
TESTOST FREE SERPL-MCNC: 0.1 NG/DL
TESTOST SERPL-MCNC: 8 NG/DL (ref 8–60)

## 2024-04-15 ENCOUNTER — OFFICE VISIT (OUTPATIENT)
Dept: FAMILY MEDICINE | Facility: CLINIC | Age: 34
End: 2024-04-15
Payer: COMMERCIAL

## 2024-04-15 VITALS
RESPIRATION RATE: 16 BRPM | TEMPERATURE: 97.9 F | DIASTOLIC BLOOD PRESSURE: 49 MMHG | SYSTOLIC BLOOD PRESSURE: 92 MMHG | OXYGEN SATURATION: 97 % | HEART RATE: 68 BPM

## 2024-04-15 DIAGNOSIS — H10.32 ACUTE BACTERIAL CONJUNCTIVITIS OF LEFT EYE: Primary | ICD-10-CM

## 2024-04-15 PROCEDURE — 99213 OFFICE O/P EST LOW 20 MIN: CPT | Performed by: PHYSICIAN ASSISTANT

## 2024-04-15 RX ORDER — POLYMYXIN B SULFATE AND TRIMETHOPRIM 1; 10000 MG/ML; [USP'U]/ML
SOLUTION OPHTHALMIC
Qty: 10 ML | Refills: 0 | Status: SHIPPED | OUTPATIENT
Start: 2024-04-15 | End: 2024-04-22

## 2024-04-15 NOTE — PATIENT INSTRUCTIONS
Thank you for choosing us for your care. I have placed an order for a prescription so that you can start treatment. View your full visit summary for details by clicking on the link below. Your pharmacist will able to address any questions you may have about the medication.     If you re not feeling better within 2-3 days, please schedule an appointment.  You can schedule an appointment right here in [facility], or call [phone]  If the visit is for the same symptoms as your eVisit, we ll refund the cost of your eVisit if seen within seven days.     Taking Care of Pinkeye at Home (01:30)  Your health professional recommends that you watch this short online health video.  Learn ways to ease the discomfort of pinkeye and keep the infection from spreading.  Purpose:  Describes basic home care for pinkeye to ease discomfort and prevent the spread of the infection.  Goal:  User will learn home treatment for pinkeye.     How to watch the video    Scan the QR code   OR Visit the website    [email or web address]   Current as of: June 5, 2023               Content Version: 14.0    [id number] [employer].   Care instructions adapted under license by your healthcare professional. If you have questions about a medical condition or this instruction, always ask your healthcare professional. [employer] disclaims any warranty or liability for your use of this information.

## 2024-04-15 NOTE — PROGRESS NOTES
Chief Complaint   Patient presents with    Eye Problem     Lt eye . Green goop. Irration.       ASSESSMENT/PLAN:  Charlotte was seen today for eye problem and conjunctivitis.    Diagnoses and all orders for this visit:    Acute bacterial conjunctivitis of left eye  -     polymixin b-trimethoprim (POLYTRIM) 15029-9.1 UNIT/ML-% ophthalmic solution; 1 drop in affected eye(s) every 3 hrs while awake for 5-7 days until resolved - max 6 doses per day    Noncontact lens wear.  Start drops above.    Navin Kim PA-C      SUBJECTIVE:  Charlotte is a 33 year old female who presents to urgent care with 1 day of left eye being red, irritated and lots of green goopy discharge and from it.  Does not wear contacts.  Feels otherwise well.    ROS: Pertinent ROS neg other than the symptoms noted above in the HPI.     OBJECTIVE:  BP 92/49   Pulse 68   Temp 97.9  F (36.6  C) (Oral)   Resp 16   SpO2 97%    GENERAL: alert and no distress  EYES: Left conjunctival erythema, clear discharge, purulent discharge dried on the eyelashes, no photophobia, PERRLA    DIAGNOSTICS    No results found for any visits on 04/15/24.     Current Outpatient Medications   Medication Sig Dispense Refill    levonorgest-eth estrad 91-Day (SEASONIQUE) 0.15-0.03 &0.01 MG tablet Take 1 tablet by mouth daily 91 tablet 3     No current facility-administered medications for this visit.      Patient Active Problem List   Diagnosis    History of postpartum depression      Past Medical History:   Diagnosis Date    Depression     Postpartum depression     Only with first pregnancy.     Past Surgical History:   Procedure Laterality Date     SECTION N/A 2022    Procedure:  SECTION;  Surgeon: Jazmyne Moran MD;  Location: [facility] TOOTH EXTRACTION       Family History   Problem Relation Age of Onset    No Known Problems Mother     No Known Problems Father     No Known Problems Sister     No Known Problems Sister     No  Known Problems Brother     Diabetes Maternal Grandmother     Hypertension Maternal Grandmother      Social History     Tobacco Use    Smoking status: Former     Current packs/day: 0.00     Types: Cigarettes     Quit date: 2022     Years since quittin.9     Passive exposure: Never    Smokeless tobacco: Never   Substance Use Topics    Alcohol use: No              The plan of care was discussed with the patient. They understand and agree with the course of treatment prescribed. A printed summary was given including instructions and medications.  The use of Dragon/[employer] dictation services may have been used to construct the content in this note; any grammatical or spelling errors are non-intentional. Please contact the author of this note directly if you are in need of any clarification.

## 2024-08-20 ENCOUNTER — OFFICE VISIT (OUTPATIENT)
Dept: FAMILY MEDICINE | Facility: CLINIC | Age: 34
End: 2024-08-20
Payer: COMMERCIAL

## 2024-08-20 VITALS
HEART RATE: 90 BPM | SYSTOLIC BLOOD PRESSURE: 100 MMHG | OXYGEN SATURATION: 98 % | WEIGHT: 119 LBS | DIASTOLIC BLOOD PRESSURE: 60 MMHG | RESPIRATION RATE: 17 BRPM | HEIGHT: 61 IN | TEMPERATURE: 97.5 F | BODY MASS INDEX: 22.47 KG/M2

## 2024-08-20 DIAGNOSIS — N92.0 MENORRHAGIA WITH REGULAR CYCLE: ICD-10-CM

## 2024-08-20 DIAGNOSIS — Z01.818 PREOPERATIVE EXAMINATION: Primary | ICD-10-CM

## 2024-08-20 DIAGNOSIS — R10.12 LUQ ABDOMINAL PAIN: ICD-10-CM

## 2024-08-20 LAB
ERYTHROCYTE [DISTWIDTH] IN BLOOD BY AUTOMATED COUNT: 12.7 % (ref 10–15)
HCT VFR BLD AUTO: 40.6 % (ref 35–47)
HGB BLD-MCNC: 13.6 G/DL (ref 11.7–15.7)
MCH RBC QN AUTO: 29.6 PG (ref 26.5–33)
MCHC RBC AUTO-ENTMCNC: 33.5 G/DL (ref 31.5–36.5)
MCV RBC AUTO: 88 FL (ref 78–100)
PLATELET # BLD AUTO: 233 10E3/UL (ref 150–450)
RBC # BLD AUTO: 4.6 10E6/UL (ref 3.8–5.2)
WBC # BLD AUTO: 6.8 10E3/UL (ref 4–11)

## 2024-08-20 PROCEDURE — 83690 ASSAY OF LIPASE: CPT | Performed by: FAMILY MEDICINE

## 2024-08-20 PROCEDURE — 36415 COLL VENOUS BLD VENIPUNCTURE: CPT | Performed by: FAMILY MEDICINE

## 2024-08-20 PROCEDURE — 80053 COMPREHEN METABOLIC PANEL: CPT | Performed by: FAMILY MEDICINE

## 2024-08-20 PROCEDURE — 85027 COMPLETE CBC AUTOMATED: CPT | Performed by: FAMILY MEDICINE

## 2024-08-20 PROCEDURE — 99214 OFFICE O/P EST MOD 30 MIN: CPT | Performed by: FAMILY MEDICINE

## 2024-08-20 ASSESSMENT — PAIN SCALES - GENERAL: PAINLEVEL: NO PAIN (0)

## 2024-08-20 NOTE — PROGRESS NOTES
Preoperative Evaluation  [facility]  [address]  [facility] [address]  Phone: [phone]  Fax: [phone]  Primary Provider: MANSI Rutledge CNP  Pre-op Performing Provider: Ruy Fields MD  Aug 20, 2024             8/20/2024   Surgical Information   What procedure is being done? hysterectomy   Facility or Hospital where procedure/surgery will be performed: [facility]   Who is doing the procedure / surgery? lauren tarango   Date of surgery / procedure: [id number]   Time of surgery / procedure: 10am   Where do you plan to recover after surgery? at home with family        Fax number for surgical facility: Note does not need to be faxed, will be available electronically in Epic.    Assessment & Plan     The proposed surgical procedure is considered INTERMEDIATE risk.    Preoperative examination  Preoperative examination completed.  No contraindication to scheduled robotic total laparoscopic hysterectomy with bilateral salpingectomy and cystoscopy.    Menorrhagia with regular cycle  Update CBC to ensure no evidence for significant anemia.  - CBC with platelets    LUQ abdominal pain  Postprandial left upper quadrant abdominal pain will update lipase, CBC and comprehensive metabolic panels today.  Will notify with results.  - Lipase  - CBC with platelets  - Comprehensive metabolic panel              - No identified additional risk factors other than previously addressed         Recommendation  Approval given to proceed with proposed procedure, without further diagnostic evaluation.    Jesus Porter is a 34 year old, presenting for the following:  Pre-Op Exam (9/27/24 - - Dr. Tarango, hysterectomy)          8/20/2024    12:15 PM   Additional Questions   Roomed by [facility] related to upcoming procedure: Patient seen for preoperative clearance.  Scheduled robotic total laparoscopic hysterectomy and bilateral salpingectomy and cystoscopy.  Menorrhagia times years.   Had  2022.  Had followed up perhaps 3 months after with concerns with ongoing heavy periods as well as issues with left upper quadrant abdominal discomfort typically postprandial following dinner otherwise tends to do fine the remainder of the day.  Describes ultrasound evaluation etc.  Scheduled procedure as noted.  Comprehensive review of systems as above otherwise all negative.      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 No   Have you ever had surgery on your heart or blood vessels, such as a stent placement, a coronary artery bypass, or surgery on an artery in your head, neck, heart, or legs? No   Do you have chest pain with activity? No   Do you have a history of heart failure? No   Do you currently have a cold, bronchitis or symptoms of other infection? No   Do you have a cough, shortness of breath, or wheezing? No   Do you or anyone in your family have previous history of blood clots? No   Do you or does anyone in your family have a serious bleeding problem such as prolonged bleeding following surgeries or cuts? No   Have you ever had problems with anemia or been told to take iron pills? No   Have you had any abnormal blood loss such as black, tarry or bloody stools, or abnormal vaginal bleeding? No   Have you ever had a blood transfusion? No   Are you willing to have a blood transfusion if it is medically needed before, during, or after your surgery? Yes   Have you or any of your relatives ever had problems with anesthesia? No   Do  you have sleep apnea, excessive snoring or daytime drowsiness? No   Do you have any artifical heart valves or other implanted medical devices like a pacemaker, defibrillator, or continuous glucose monitor? No   Do you have artificial joints? No   Are you allergic to latex? No        Health Care Directive  Patient does not have a Health Care Directive or Living Will: Discussed advance care planning with patient; however, patient declined at this time.    Preoperative Review of    reviewed - no record of controlled substances prescribed.      Status of Chronic Conditions:  See problem list for active medical problems.  Problems all longstanding and stable, except as noted/documented.  See ROS for pertinent symptoms related to these conditions.    Patient Active Problem List    Diagnosis Date Noted    History of postpartum depression 2022     Priority: Medium     Patient states that she had postpartum depression from birth to about 4 or 5 months postpartum.-- tried meds briefly but did not think they made a difference, had some counseling.        Past Medical History:   Diagnosis Date    Depression     Postpartum depression     Only with first pregnancy.     Past Surgical History:   Procedure Laterality Date     SECTION N/A 2022    Procedure:  SECTION;  Surgeon: Jazmyne Moran MD;  Location: [facility] OR    WISDOM TOOTH EXTRACTION       Current Outpatient Medications   Medication Sig Dispense Refill    levonorgest-eth estrad 91-Day (SEASONIQUE) 0.15-0.03 &0.01 MG tablet Take 1 tablet by mouth daily 91 tablet 3       No Known Allergies     Social History     Tobacco Use    Smoking status: Former     Current packs/day: 0.00     Types: Cigarettes     Quit date: 2022     Years since quittin.3     Passive exposure: Never    Smokeless tobacco: Never   Substance Use Topics    Alcohol use: No     Family History   Problem Relation Age of Onset    No Known Problems Mother      "No Known Problems Father     No Known Problems Sister     No Known Problems Sister     No Known Problems Brother     Diabetes Maternal Grandmother     Hypertension Maternal Grandmother      History   Drug Use No             Review of Systems  Constitutional, HEENT, cardiovascular, pulmonary, GI, , musculoskeletal, neuro, skin, endocrine and psych systems are negative, except as otherwise noted.    Objective    /60   Pulse 90   Temp 97.5  F (36.4  C)   Resp 17   Ht 1.549 m (5' 1\")   Wt 54 kg (119 lb)   LMP  (LMP Unknown)   SpO2 98%   BMI 22.48 kg/m     Estimated body mass index is 22.48 kg/m  as calculated from the following:    Height as of this encounter: 1.549 m (5' 1\").    Weight as of this encounter: 54 kg (119 lb).    Physical Exam  GENERAL: alert and no distress  EYES: Eyes grossly normal to inspection, PERRL and conjunctivae and sclerae normal  HENT: ear canals and TM's normal, nose and mouth without ulcers or lesions  NECK: no adenopathy, no asymmetry, masses, or scars  RESP: lungs clear to auscultation - no rales, rhonchi or wheezes  CV: regular rate and rhythm, normal S1 S2, no S3 or S4, no murmur, click or rub, no peripheral edema  ABDOMEN: soft, nontender, no hepatosplenomegaly, no masses and bowel sounds normal  MS: no gross musculoskeletal defects noted, no edema  SKIN: no suspicious lesions or rashes  NEURO: Normal strength and tone, mentation intact and speech normal  PSYCH: mentation appears normal, affect normal/bright    Recent Labs   Lab Test 03/08/24  1409   HGB 13.7           Diagnostics  Labs pending at this time.  Results will be reviewed when available.  No results found for this or any previous visit (from the past 24 hour(s)).   No EKG required, no history of coronary heart disease, significant arrhythmia, peripheral arterial disease or other structural heart disease.      EXAM: US PELVIC TRANSABDOMINAL AND TRANSVAGINAL  LOCATION: [facility]" HOSPITAL  DATE/TIME: 3/24/2023 2:42 PM     INDICATION: IUD position check.  Abnormal uterine bleeding.  COMPARISON: None.  TECHNIQUE: Transabdominal scans were performed. Endovaginal ultrasound was performed to better visualize the adnexa.     FINDINGS:     UTERUS: 7.8 x 3.7 x 5.3 cm. Retroverted. No fibroids. A  section scar is present in the anterior lower uterine segment.     ENDOMETRIUM: Intrauterine device is appropriately positioned within the endometrial canal and somewhat obscures evaluation of the endometrium. Visualized portion of the endometrium is normal in thickness, measuring 4 mm. Trace fluid is present within the   endometrial canal.     RIGHT OVARY: 3.5 x 2.1 x 1.6 cm. Normal.     LEFT OVARY: 3.5 x 1.6 x 1.9 cm. Normal.     No significant free fluid.                                                                      IMPRESSION:     1.  Appropriately positioned intrauterine device.      Revised Cardiac Risk Index (RCRI)  The patient has the following serious cardiovascular risks for perioperative complications:   - No serious cardiac risks = 0 points     RCRI Interpretation: 0 points: Class I (very low risk - 0.4% complication rate)         Signed Electronically by: Ruy Fields MD  A copy of this evaluation report is provided to the requesting physician.

## 2024-08-21 LAB
ALBUMIN SERPL BCG-MCNC: 4.4 G/DL (ref 3.5–5.2)
ALP SERPL-CCNC: 68 U/L (ref 40–150)
ALT SERPL W P-5'-P-CCNC: 12 U/L (ref 0–50)
ANION GAP SERPL CALCULATED.3IONS-SCNC: 11 MMOL/L (ref 7–15)
AST SERPL W P-5'-P-CCNC: 16 U/L (ref 0–45)
BILIRUB SERPL-MCNC: 0.3 MG/DL
BUN SERPL-MCNC: 10.2 MG/DL (ref 6–20)
CALCIUM SERPL-MCNC: 9.6 MG/DL (ref 8.8–10.4)
CHLORIDE SERPL-SCNC: 106 MMOL/L (ref 98–107)
CREAT SERPL-MCNC: 0.94 MG/DL (ref 0.51–0.95)
EGFRCR SERPLBLD CKD-EPI 2021: 82 ML/MIN/1.73M2
GLUCOSE SERPL-MCNC: 87 MG/DL (ref 70–99)
HCO3 SERPL-SCNC: 25 MMOL/L (ref 22–29)
LIPASE SERPL-CCNC: 30 U/L (ref 13–60)
POTASSIUM SERPL-SCNC: 4.6 MMOL/L (ref 3.4–5.3)
PROT SERPL-MCNC: 6.9 G/DL (ref 6.4–8.3)
SODIUM SERPL-SCNC: 142 MMOL/L (ref 135–145)

## 2024-09-25 ENCOUNTER — OFFICE VISIT (OUTPATIENT)
Dept: FAMILY MEDICINE | Facility: CLINIC | Age: 34
End: 2024-09-25
Payer: COMMERCIAL

## 2024-09-25 VITALS
HEART RATE: 79 BPM | SYSTOLIC BLOOD PRESSURE: 120 MMHG | WEIGHT: 119 LBS | BODY MASS INDEX: 22.47 KG/M2 | TEMPERATURE: 98.1 F | OXYGEN SATURATION: 98 % | DIASTOLIC BLOOD PRESSURE: 70 MMHG | RESPIRATION RATE: 13 BRPM | HEIGHT: 61 IN

## 2024-09-25 DIAGNOSIS — Z01.818 PREOP GENERAL PHYSICAL EXAM: Primary | ICD-10-CM

## 2024-09-25 DIAGNOSIS — R10.12 LUQ ABDOMINAL PAIN: ICD-10-CM

## 2024-09-25 DIAGNOSIS — N92.1 MENOMETRORRHAGIA: ICD-10-CM

## 2024-09-25 LAB — HCG UR QL: NEGATIVE

## 2024-09-25 PROCEDURE — 81025 URINE PREGNANCY TEST: CPT | Performed by: FAMILY MEDICINE

## 2024-09-25 PROCEDURE — 99213 OFFICE O/P EST LOW 20 MIN: CPT | Performed by: FAMILY MEDICINE

## 2024-09-25 ASSESSMENT — PAIN SCALES - GENERAL: PAINLEVEL: NO PAIN (0)

## 2024-09-25 NOTE — PROGRESS NOTES
Preoperative Evaluation  [facility]  [address]  [facility] [address]  Phone: [phone]  Fax: [phone]  Primary Provider: Ruy Fields MD  Pre-op Performing Provider: Ruy Fields MD  Sep 25, 2024             2024   Surgical Information   What procedure is being done? hysterectomy   Facility or Hospital where procedure/surgery will be performed[email or web address]   Who is doing the procedure / surgery? keith pandya   Date of surgery / procedure: 24   Time of surgery / procedure: 130pm   Where do you plan to recover after surgery? at home with family        Fax number for surgical facility: Note does not need to be faxed, will be available electronically in Epic.    Assessment & Plan     The proposed surgical procedure is considered INTERMEDIATE risk.    Preoperative examination  Preoperative examination completed.  No contraindication to scheduled robotic total laparoscopic hysterectomy with bilateral salpingectomy and cystoscopy.     Menorrhagia with regular cycle  Update CBC to ensure no evidence for significant anemia.  - CBC with platelets     LUQ abdominal pain  Postprandial left upper quadrant abdominal pain will update lipase, CBC and comprehensive metabolic panels today.  Will notify with results.              - No identified additional risk factors other than previously addressed         Recommendation  Approval given to proceed with proposed procedure, without further diagnostic evaluation.    Jesus Porter is a 34 year old, presenting for the following:  Pre-Op Exam          2024    12:15 PM   Additional Questions   Roomed by [facility] related to upcoming procedure:   Patient seen for preoperative clearance.  Scheduled robotic total laparoscopic hysterectomy and bilateral salpingectomy and cystoscopy.  Menorrhagia times years.  Had  2022.  Had followed up perhaps 3 months after with concerns with ongoing heavy periods as  well as issues with left upper quadrant abdominal discomfort typically postprandial following dinner otherwise tends to do fine the remainder of the day.  Describes ultrasound evaluation etc.  Scheduled procedure as noted.  Comprehensive review of systems as above otherwise all negative.       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 No   Have you ever had surgery on your heart or blood vessels, such as a stent placement, a coronary artery bypass, or surgery on an artery in your head, neck, heart, or legs? No   Do you have chest pain with activity? No   Do you have a history of heart failure? No   Do you currently have a cold, bronchitis or symptoms of other infection? No   Do you have a cough, shortness of breath, or wheezing? No   Do you or anyone in your family have previous history of blood clots? No   Do you or does anyone in your family have a serious bleeding problem such as prolonged bleeding following surgeries or cuts? No   Have you ever had problems with anemia or been told to take iron pills? No   Have you had any abnormal blood loss such as black, tarry or bloody stools, or abnormal vaginal bleeding? No   Have you ever had a blood transfusion? No   Are you willing to have a blood transfusion if it is medically needed before, during, or after your surgery? Yes   Have you or any of your relatives ever had problems with anesthesia? No   Do you have sleep apnea, excessive snoring or daytime drowsiness? No   Do you have any artifical heart  valves or other implanted medical devices like a pacemaker, defibrillator, or continuous glucose monitor? No   Do you have artificial joints? No   Are you allergic to latex? No        Health Care Directive  Patient does not have a Health Care Directive or Living Will: Discussed advance care planning with patient; however, patient declined at this time.    Preoperative Review of    reviewed - no record of controlled substances prescribed.      Status of Chronic Conditions:  See problem list for active medical problems.  Problems all longstanding and stable, except as noted/documented.  See ROS for pertinent symptoms related to these conditions.    Patient Active Problem List    Diagnosis Date Noted    History of postpartum depression 2022     Priority: Medium     Patient states that she had postpartum depression from birth to about 4 or 5 months postpartum.-- tried meds briefly but did not think they made a difference, had some counseling.        Past Medical History:   Diagnosis Date    Depression     Postpartum depression     Only with first pregnancy.     Past Surgical History:   Procedure Laterality Date     SECTION N/A 2022    Procedure:  SECTION;  Surgeon: Jazmyne Moran MD;  Location: [facility] OR    WISDOM TOOTH EXTRACTION       Current Outpatient Medications   Medication Sig Dispense Refill    levonorgest-eth estrad 91-Day (SEASONIQUE) 0.15-0.03 &0.01 MG tablet Take 1 tablet by mouth daily 91 tablet 3       No Known Allergies     Social History     Tobacco Use    Smoking status: Former     Current packs/day: 0.00     Types: Cigarettes     Quit date: 2022     Years since quittin.4     Passive exposure: Never    Smokeless tobacco: Never   Substance Use Topics    Alcohol use: No     Family History   Problem Relation Age of Onset    No Known Problems Mother     No Known Problems Father     No Known Problems Sister     No Known Problems Sister     No Known  "Problems Brother     Diabetes Maternal Grandmother     Hypertension Maternal Grandmother      History   Drug Use No             Review of Systems  Constitutional, HEENT, cardiovascular, pulmonary, GI, , musculoskeletal, neuro, skin, endocrine and psych systems are negative, except as otherwise noted.    Objective    /70   Pulse 79   Temp 98.1  F (36.7  C)   Resp 13   Ht 1.549 m (5' 1\")   Wt 54 kg (119 lb)   LMP 08/26/2024 (Exact Date)   SpO2 98%   BMI 22.48 kg/m     Estimated body mass index is 22.48 kg/m  as calculated from the following:    Height as of this encounter: 1.549 m (5' 1\").    Weight as of this encounter: 54 kg (119 lb).  Physical Exam  GENERAL: alert and no distress  EYES: Eyes grossly normal to inspection, PERRL and conjunctivae and sclerae normal  HENT: ear canals and TM's normal, nose and mouth without ulcers or lesions  NECK: no adenopathy, no asymmetry, masses, or scars  RESP: lungs clear to auscultation - no rales, rhonchi or wheezes  CV: regular rate and rhythm, normal S1 S2, no S3 or S4, no murmur, click or rub, no peripheral edema  ABDOMEN: soft, nontender, no hepatosplenomegaly, no masses and bowel sounds normal  MS: no gross musculoskeletal defects noted, no edema  SKIN: no suspicious lesions or rashes  NEURO: Normal strength and tone, mentation intact and speech normal  PSYCH: mentation appears normal, affect normal/bright    Recent Labs   Lab Test 08/20/24  1317 03/08/24  1409   HGB 13.6 13.7    255     --    POTASSIUM 4.6  --    CR 0.94  --         Diagnostics  Labs pending at this time.  Results will be reviewed when available.  Recent Results (from the past 24 hour(s))   HCG qualitative urine    Collection Time: 09/25/24  3:58 PM   Result Value Ref Range    hCG Urine Qualitative Negative Negative      No EKG required, no history of coronary heart disease, significant arrhythmia, peripheral arterial disease or other structural heart disease.    Revised " Cardiac Risk Index (RCRI)  The patient has the following serious cardiovascular risks for perioperative complications:   - No serious cardiac risks = 0 points     RCRI Interpretation: 0 points: Class I (very low risk - 0.4% complication rate)         Signed Electronically by: Ruy Fields MD  A copy of this evaluation report is provided to the requesting physician.

## 2024-09-27 ENCOUNTER — ANESTHESIA EVENT (OUTPATIENT)
Dept: SURGERY | Facility: CLINIC | Age: 34
End: 2024-09-27
Payer: COMMERCIAL

## 2024-09-27 ENCOUNTER — HOSPITAL ENCOUNTER (OUTPATIENT)
Facility: CLINIC | Age: 34
Discharge: HOME | End: 2024-09-27
Attending: OBSTETRICS & GYNECOLOGY | Admitting: OBSTETRICS & GYNECOLOGY
Payer: COMMERCIAL

## 2024-09-27 ENCOUNTER — ANESTHESIA (OUTPATIENT)
Dept: SURGERY | Facility: CLINIC | Age: 34
End: 2024-09-27
Payer: COMMERCIAL

## 2024-09-27 VITALS
BODY MASS INDEX: 22.47 KG/M2 | WEIGHT: 119 LBS | HEIGHT: 61 IN | RESPIRATION RATE: 16 BRPM | HEART RATE: 57 BPM | SYSTOLIC BLOOD PRESSURE: 123 MMHG | DIASTOLIC BLOOD PRESSURE: 72 MMHG | OXYGEN SATURATION: 98 % | TEMPERATURE: 97.5 F

## 2024-09-27 LAB
ABO/RH(D): NORMAL
ANTIBODY SCREEN: NEGATIVE
BASOPHILS # BLD AUTO: 0 10E3/UL (ref 0–0.2)
BASOPHILS NFR BLD AUTO: 0 %
EOSINOPHIL # BLD AUTO: 0 10E3/UL (ref 0–0.7)
EOSINOPHIL NFR BLD AUTO: 0 %
ERYTHROCYTE [DISTWIDTH] IN BLOOD BY AUTOMATED COUNT: 13.1 % (ref 10–15)
HCG UR QL: NEGATIVE
HCT VFR BLD AUTO: 38.3 % (ref 35–47)
HGB BLD-MCNC: 13.4 G/DL (ref 11.7–15.7)
IMM GRANULOCYTES # BLD: 0 10E3/UL
IMM GRANULOCYTES NFR BLD: 0 %
LYMPHOCYTES # BLD AUTO: 1.8 10E3/UL (ref 0.8–5.3)
LYMPHOCYTES NFR BLD AUTO: 33 %
MCH RBC QN AUTO: 30.2 PG (ref 26.5–33)
MCHC RBC AUTO-ENTMCNC: 35 G/DL (ref 31.5–36.5)
MCV RBC AUTO: 87 FL (ref 78–100)
MONOCYTES # BLD AUTO: 0.5 10E3/UL (ref 0–1.3)
MONOCYTES NFR BLD AUTO: 9 %
NEUTROPHILS # BLD AUTO: 3.2 10E3/UL (ref 1.6–8.3)
NEUTROPHILS NFR BLD AUTO: 58 %
NRBC # BLD AUTO: 0 10E3/UL
NRBC BLD AUTO-RTO: 0 /100
PLATELET # BLD AUTO: 245 10E3/UL (ref 150–450)
RBC # BLD AUTO: 4.43 10E6/UL (ref 3.8–5.2)
SPECIMEN EXPIRATION DATE: NORMAL
WBC # BLD AUTO: 5.6 10E3/UL (ref 4–11)

## 2024-09-27 PROCEDURE — 272N000001 HC OR GENERAL SUPPLY STERILE: Performed by: OBSTETRICS & GYNECOLOGY

## 2024-09-27 PROCEDURE — 36415 COLL VENOUS BLD VENIPUNCTURE: CPT | Performed by: NURSE PRACTITIONER

## 2024-09-27 PROCEDURE — 250N000011 HC RX IP 250 OP 636: Performed by: NURSE PRACTITIONER

## 2024-09-27 PROCEDURE — 250N000011 HC RX IP 250 OP 636: Performed by: STUDENT IN AN ORGANIZED HEALTH CARE EDUCATION/TRAINING PROGRAM

## 2024-09-27 PROCEDURE — 250N000009 HC RX 250: Performed by: NURSE ANESTHETIST, CERTIFIED REGISTERED

## 2024-09-27 PROCEDURE — 86900 BLOOD TYPING SEROLOGIC ABO: CPT | Performed by: NURSE PRACTITIONER

## 2024-09-27 PROCEDURE — 85004 AUTOMATED DIFF WBC COUNT: CPT | Performed by: NURSE PRACTITIONER

## 2024-09-27 PROCEDURE — 86901 BLOOD TYPING SEROLOGIC RH(D): CPT | Performed by: NURSE PRACTITIONER

## 2024-09-27 PROCEDURE — 258N000003 HC RX IP 258 OP 636: Performed by: STUDENT IN AN ORGANIZED HEALTH CARE EDUCATION/TRAINING PROGRAM

## 2024-09-27 PROCEDURE — 81025 URINE PREGNANCY TEST: CPT | Performed by: NURSE PRACTITIONER

## 2024-09-27 PROCEDURE — 370N000017 HC ANESTHESIA TECHNICAL FEE, PER MIN: Performed by: OBSTETRICS & GYNECOLOGY

## 2024-09-27 PROCEDURE — 250N000013 HC RX MED GY IP 250 OP 250 PS 637: Performed by: ANESTHESIOLOGY

## 2024-09-27 PROCEDURE — 710N000010 HC RECOVERY PHASE 1, LEVEL 2, PER MIN: Performed by: OBSTETRICS & GYNECOLOGY

## 2024-09-27 PROCEDURE — 88307 TISSUE EXAM BY PATHOLOGIST: CPT | Mod: TC | Performed by: OBSTETRICS & GYNECOLOGY

## 2024-09-27 PROCEDURE — 250N000013 HC RX MED GY IP 250 OP 250 PS 637: Performed by: NURSE PRACTITIONER

## 2024-09-27 PROCEDURE — 250N000011 HC RX IP 250 OP 636: Performed by: OBSTETRICS & GYNECOLOGY

## 2024-09-27 PROCEDURE — 360N000080 HC SURGERY LEVEL 7, PER MIN: Performed by: OBSTETRICS & GYNECOLOGY

## 2024-09-27 PROCEDURE — 250N000013 HC RX MED GY IP 250 OP 250 PS 637: Performed by: STUDENT IN AN ORGANIZED HEALTH CARE EDUCATION/TRAINING PROGRAM

## 2024-09-27 PROCEDURE — 999N000141 HC STATISTIC PRE-PROCEDURE NURSING ASSESSMENT: Performed by: OBSTETRICS & GYNECOLOGY

## 2024-09-27 PROCEDURE — 88307 TISSUE EXAM BY PATHOLOGIST: CPT | Mod: 26 | Performed by: PATHOLOGY

## 2024-09-27 PROCEDURE — 710N000012 HC RECOVERY PHASE 2, PER MINUTE: Performed by: OBSTETRICS & GYNECOLOGY

## 2024-09-27 PROCEDURE — 250N000011 HC RX IP 250 OP 636: Performed by: NURSE ANESTHETIST, CERTIFIED REGISTERED

## 2024-09-27 PROCEDURE — 258N000003 HC RX IP 258 OP 636: Performed by: OBSTETRICS & GYNECOLOGY

## 2024-09-27 RX ORDER — SODIUM CHLORIDE, SODIUM LACTATE, POTASSIUM CHLORIDE, CALCIUM CHLORIDE 600; 310; 30; 20 MG/100ML; MG/100ML; MG/100ML; MG/100ML
INJECTION, SOLUTION INTRAVENOUS CONTINUOUS
Status: DISCONTINUED | OUTPATIENT
Start: 2024-09-27 | End: 2024-09-27 | Stop reason: HOSPADM

## 2024-09-27 RX ORDER — FENTANYL CITRATE 50 UG/ML
INJECTION, SOLUTION INTRAMUSCULAR; INTRAVENOUS
Status: DISCONTINUED
Start: 2024-09-27 | End: 2024-09-27 | Stop reason: HOSPADM

## 2024-09-27 RX ORDER — ONDANSETRON 4 MG/1
4 TABLET, ORALLY DISINTEGRATING ORAL EVERY 30 MIN PRN
Status: DISCONTINUED | OUTPATIENT
Start: 2024-09-27 | End: 2024-09-27 | Stop reason: HOSPADM

## 2024-09-27 RX ORDER — OXYBUTYNIN CHLORIDE 5 MG/1
5 TABLET ORAL ONCE
Status: COMPLETED | OUTPATIENT
Start: 2024-09-27 | End: 2024-09-27

## 2024-09-27 RX ORDER — CEFAZOLIN SODIUM/WATER 2 G/20 ML
2 SYRINGE (ML) INTRAVENOUS SEE ADMIN INSTRUCTIONS
Status: DISCONTINUED | OUTPATIENT
Start: 2024-09-27 | End: 2024-09-27 | Stop reason: HOSPADM

## 2024-09-27 RX ORDER — CEFAZOLIN SODIUM/WATER 2 G/20 ML
2 SYRINGE (ML) INTRAVENOUS
Status: COMPLETED | OUTPATIENT
Start: 2024-09-27 | End: 2024-09-27

## 2024-09-27 RX ORDER — ONDANSETRON 2 MG/ML
4 INJECTION INTRAMUSCULAR; INTRAVENOUS EVERY 30 MIN PRN
Status: DISCONTINUED | OUTPATIENT
Start: 2024-09-27 | End: 2024-09-27 | Stop reason: HOSPADM

## 2024-09-27 RX ORDER — PHENAZOPYRIDINE HYDROCHLORIDE 100 MG/1
100 TABLET, FILM COATED ORAL ONCE
Status: DISCONTINUED | OUTPATIENT
Start: 2024-09-27 | End: 2024-09-27 | Stop reason: HOSPADM

## 2024-09-27 RX ORDER — DEXAMETHASONE SODIUM PHOSPHATE 10 MG/ML
4 INJECTION, SOLUTION INTRAMUSCULAR; INTRAVENOUS
Status: DISCONTINUED | OUTPATIENT
Start: 2024-09-27 | End: 2024-09-27 | Stop reason: HOSPADM

## 2024-09-27 RX ORDER — LIDOCAINE HYDROCHLORIDE 10 MG/ML
INJECTION, SOLUTION INFILTRATION; PERINEURAL PRN
Status: DISCONTINUED | OUTPATIENT
Start: 2024-09-27 | End: 2024-09-27

## 2024-09-27 RX ORDER — FENTANYL CITRATE 50 UG/ML
50 INJECTION, SOLUTION INTRAMUSCULAR; INTRAVENOUS EVERY 5 MIN PRN
Status: DISCONTINUED | OUTPATIENT
Start: 2024-09-27 | End: 2024-09-27 | Stop reason: HOSPADM

## 2024-09-27 RX ORDER — OXYCODONE HYDROCHLORIDE 5 MG/1
10 TABLET ORAL
Status: DISCONTINUED | OUTPATIENT
Start: 2024-09-27 | End: 2024-09-27 | Stop reason: HOSPADM

## 2024-09-27 RX ORDER — HYDROMORPHONE HCL IN WATER/PF 6 MG/30 ML
PATIENT CONTROLLED ANALGESIA SYRINGE INTRAVENOUS
Status: DISCONTINUED
Start: 2024-09-27 | End: 2024-09-27 | Stop reason: HOSPADM

## 2024-09-27 RX ORDER — BUPIVACAINE HYDROCHLORIDE 2.5 MG/ML
INJECTION, SOLUTION INFILTRATION; PERINEURAL PRN
Status: DISCONTINUED | OUTPATIENT
Start: 2024-09-27 | End: 2024-09-27 | Stop reason: HOSPADM

## 2024-09-27 RX ORDER — OXYCODONE HYDROCHLORIDE 5 MG/1
5 TABLET ORAL
Status: COMPLETED | OUTPATIENT
Start: 2024-09-27 | End: 2024-09-27

## 2024-09-27 RX ORDER — NALOXONE HYDROCHLORIDE 0.4 MG/ML
0.1 INJECTION, SOLUTION INTRAMUSCULAR; INTRAVENOUS; SUBCUTANEOUS
Status: DISCONTINUED | OUTPATIENT
Start: 2024-09-27 | End: 2024-09-27 | Stop reason: HOSPADM

## 2024-09-27 RX ORDER — FENTANYL CITRATE 50 UG/ML
25 INJECTION, SOLUTION INTRAMUSCULAR; INTRAVENOUS EVERY 5 MIN PRN
Status: DISCONTINUED | OUTPATIENT
Start: 2024-09-27 | End: 2024-09-27 | Stop reason: HOSPADM

## 2024-09-27 RX ORDER — HYDROMORPHONE HCL IN WATER/PF 6 MG/30 ML
0.2 PATIENT CONTROLLED ANALGESIA SYRINGE INTRAVENOUS EVERY 5 MIN PRN
Status: DISCONTINUED | OUTPATIENT
Start: 2024-09-27 | End: 2024-09-27 | Stop reason: HOSPADM

## 2024-09-27 RX ORDER — HYDROMORPHONE HCL IN WATER/PF 6 MG/30 ML
0.4 PATIENT CONTROLLED ANALGESIA SYRINGE INTRAVENOUS EVERY 5 MIN PRN
Status: DISCONTINUED | OUTPATIENT
Start: 2024-09-27 | End: 2024-09-27 | Stop reason: HOSPADM

## 2024-09-27 RX ORDER — PROPOFOL 10 MG/ML
INJECTION, EMULSION INTRAVENOUS CONTINUOUS PRN
Status: DISCONTINUED | OUTPATIENT
Start: 2024-09-27 | End: 2024-09-27

## 2024-09-27 RX ORDER — METRONIDAZOLE 500 MG/100ML
500 INJECTION, SOLUTION INTRAVENOUS ONCE
Status: COMPLETED | OUTPATIENT
Start: 2024-09-27 | End: 2024-09-27

## 2024-09-27 RX ORDER — DEXAMETHASONE SODIUM PHOSPHATE 10 MG/ML
INJECTION, SOLUTION INTRAMUSCULAR; INTRAVENOUS PRN
Status: DISCONTINUED | OUTPATIENT
Start: 2024-09-27 | End: 2024-09-27

## 2024-09-27 RX ORDER — DEXAMETHASONE SODIUM PHOSPHATE 4 MG/ML
4 INJECTION, SOLUTION INTRA-ARTICULAR; INTRALESIONAL; INTRAMUSCULAR; INTRAVENOUS; SOFT TISSUE
Status: DISCONTINUED | OUTPATIENT
Start: 2024-09-27 | End: 2024-09-27 | Stop reason: HOSPADM

## 2024-09-27 RX ORDER — ACETAMINOPHEN 325 MG/1
975 TABLET ORAL ONCE
Status: COMPLETED | OUTPATIENT
Start: 2024-09-27 | End: 2024-09-27

## 2024-09-27 RX ORDER — LIDOCAINE 40 MG/G
CREAM TOPICAL
Status: DISCONTINUED | OUTPATIENT
Start: 2024-09-27 | End: 2024-09-27 | Stop reason: HOSPADM

## 2024-09-27 RX ORDER — PROPOFOL 10 MG/ML
INJECTION, EMULSION INTRAVENOUS PRN
Status: DISCONTINUED | OUTPATIENT
Start: 2024-09-27 | End: 2024-09-27

## 2024-09-27 RX ORDER — SODIUM CHLORIDE, SODIUM LACTATE, POTASSIUM CHLORIDE, AND CALCIUM CHLORIDE .6; .31; .03; .02 G/100ML; G/100ML; G/100ML; G/100ML
IRRIGANT IRRIGATION PRN
Status: DISCONTINUED | OUTPATIENT
Start: 2024-09-27 | End: 2024-09-27 | Stop reason: HOSPADM

## 2024-09-27 RX ORDER — BUPIVACAINE HYDROCHLORIDE 2.5 MG/ML
INJECTION, SOLUTION INFILTRATION; PERINEURAL
Status: DISCONTINUED
Start: 2024-09-27 | End: 2024-09-27 | Stop reason: HOSPADM

## 2024-09-27 RX ORDER — FENTANYL CITRATE 50 UG/ML
INJECTION, SOLUTION INTRAMUSCULAR; INTRAVENOUS PRN
Status: DISCONTINUED | OUTPATIENT
Start: 2024-09-27 | End: 2024-09-27

## 2024-09-27 RX ADMIN — Medication 2 G: at 14:51

## 2024-09-27 RX ADMIN — PROPOFOL 200 MCG/KG/MIN: 10 INJECTION, EMULSION INTRAVENOUS at 15:00

## 2024-09-27 RX ADMIN — LIDOCAINE HYDROCHLORIDE 5 ML: 10 INJECTION, SOLUTION INFILTRATION; PERINEURAL at 15:00

## 2024-09-27 RX ADMIN — HYDROMORPHONE HYDROCHLORIDE 0.5 MG: 1 INJECTION, SOLUTION INTRAMUSCULAR; INTRAVENOUS; SUBCUTANEOUS at 15:23

## 2024-09-27 RX ADMIN — ROCURONIUM BROMIDE 40 MG: 10 INJECTION, SOLUTION INTRAVENOUS at 15:00

## 2024-09-27 RX ADMIN — FENTANYL CITRATE 50 MCG: 50 INJECTION INTRAMUSCULAR; INTRAVENOUS at 16:34

## 2024-09-27 RX ADMIN — DEXAMETHASONE SODIUM PHOSPHATE 4 MG: 10 INJECTION, SOLUTION INTRAMUSCULAR; INTRAVENOUS at 15:00

## 2024-09-27 RX ADMIN — METRONIDAZOLE 500 MG: 500 INJECTION, SOLUTION INTRAVENOUS at 13:56

## 2024-09-27 RX ADMIN — PROCHLORPERAZINE EDISYLATE 5 MG: 5 INJECTION INTRAMUSCULAR; INTRAVENOUS at 18:18

## 2024-09-27 RX ADMIN — ACETAMINOPHEN 975 MG: 325 TABLET ORAL at 13:26

## 2024-09-27 RX ADMIN — Medication 0.4 MG: at 17:00

## 2024-09-27 RX ADMIN — SODIUM CHLORIDE, POTASSIUM CHLORIDE, SODIUM LACTATE AND CALCIUM CHLORIDE 100 ML/HR: 600; 310; 30; 20 INJECTION, SOLUTION INTRAVENOUS at 16:15

## 2024-09-27 RX ADMIN — MIDAZOLAM 2 MG: 1 INJECTION INTRAMUSCULAR; INTRAVENOUS at 14:51

## 2024-09-27 RX ADMIN — OXYBUTYNIN CHLORIDE 5 MG: 5 TABLET ORAL at 18:20

## 2024-09-27 RX ADMIN — HYDROMORPHONE HYDROCHLORIDE 0.4 MG: 0.2 INJECTION, SOLUTION INTRAMUSCULAR; INTRAVENOUS; SUBCUTANEOUS at 17:00

## 2024-09-27 RX ADMIN — PROPOFOL 200 MG: 10 INJECTION, EMULSION INTRAVENOUS at 15:00

## 2024-09-27 RX ADMIN — SODIUM CHLORIDE, POTASSIUM CHLORIDE, SODIUM LACTATE AND CALCIUM CHLORIDE: 600; 310; 30; 20 INJECTION, SOLUTION INTRAVENOUS at 13:55

## 2024-09-27 RX ADMIN — OXYCODONE 5 MG: 5 TABLET ORAL at 17:43

## 2024-09-27 RX ADMIN — FENTANYL CITRATE 50 MCG: 50 INJECTION INTRAMUSCULAR; INTRAVENOUS at 15:11

## 2024-09-27 RX ADMIN — FENTANYL CITRATE 50 MCG: 50 INJECTION INTRAMUSCULAR; INTRAVENOUS at 16:40

## 2024-09-27 RX ADMIN — ONDANSETRON 4 MG: 4 TABLET, ORALLY DISINTEGRATING ORAL at 17:43

## 2024-09-27 RX ADMIN — FENTANYL CITRATE 50 MCG: 50 INJECTION INTRAMUSCULAR; INTRAVENOUS at 15:21

## 2024-09-27 ASSESSMENT — ACTIVITIES OF DAILY LIVING (ADL)
ADLS_ACUITY_SCORE: 16
ADLS_ACUITY_SCORE: 18
ADLS_ACUITY_SCORE: 19
ADLS_ACUITY_SCORE: 18
ADLS_ACUITY_SCORE: 18

## 2024-09-27 NOTE — H&P
History and Physical Update    I have examined the patient and reviewed the history and physical that is present on this chart. The changes in the patient's history and physical condition are as follows:         Belle Tarango MD

## 2024-09-27 NOTE — ANESTHESIA PROCEDURE NOTES
Airway       Patient location during procedure: OR       Procedure Start/Stop Times: 9/27/2024 3:02 PM  Staff -        Anesthesiologist:  Bill Rausch DO       CRNA: Yesenia Sanchez APRN CRNA       Performed By: CRNA  Consent for Airway        Urgency: elective  Indications and Patient Condition       Indications for airway management: catarina-procedural       Induction type:intravenous       Mask difficulty assessment: 1 - vent by mask    Final Airway Details       Final airway type: endotracheal airway       Successful airway: ETT - single  Endotracheal Airway Details        ETT size (mm): 7.0       Cuffed: yes       Successful intubation technique: direct laryngoscopy       DL Blade Type: Reddy 2       Grade View of Cords: 1       Adjucts: stylet       Position: Right       Measured from: gums/teeth       Secured at (cm): 20       Bite block used: None    Post intubation assessment        Placement verified by: capnometry, equal breath sounds and chest rise        Number of attempts at approach: 1       Secured with: tape       Ease of procedure: easy       Dentition: Intact and Unchanged       Dental guard used and removed. Dental Guard Type: Standard White.    Medication(s) Administered   Medication Administration Time: 9/27/2024 3:02 PM

## 2024-09-27 NOTE — ANESTHESIA PREPROCEDURE EVALUATION
Anesthesia Pre-Procedure Evaluation    Patient: Charlotte Clarke   MRN: [id number] : 1990        Procedure : Procedure(s):  ROBOTIC TOTAL LAPAROSCOPIC HYSTERECTOMY, BILATERAL SALPINGECTOMY, CYSTOSCOPY          Past Medical History:   Diagnosis Date     Depression      Postpartum depression     Only with first pregnancy.      Past Surgical History:   Procedure Laterality Date      SECTION N/A 2022    Procedure:  SECTION;  Surgeon: Jazmyne Moran MD;  Location: [facility] OR     WISDOM TOOTH EXTRACTION        No Known Allergies   Social History     Tobacco Use     Smoking status: Former     Current packs/day: 0.00     Types: Cigarettes     Quit date: 2022     Years since quittin.4     Passive exposure: Never     Smokeless tobacco: Never   Substance Use Topics     Alcohol use: No      Wt Readings from Last 1 Encounters:   24 54 kg (119 lb)        Anesthesia Evaluation            ROS/MED HX  ENT/Pulmonary:  - neg pulmonary ROS     Neurologic:  - neg neurologic ROS     Cardiovascular:  - neg cardiovascular ROS     METS/Exercise Tolerance:     Hematologic:       Musculoskeletal:       GI/Hepatic:  - neg GI/hepatic ROS     Renal/Genitourinary:  - neg Renal ROS     Endo:       Psychiatric/Substance Use:     (+) psychiatric history depression       Infectious Disease:       Malignancy:       Other:            Physical Exam    Airway        Mallampati: II   TM distance: > 3 FB   Neck ROM: full     Respiratory Devices and Support         Dental       (+) Minor Abnormalities - some fillings, tiny chips      Cardiovascular   cardiovascular exam normal          Pulmonary   pulmonary exam normal              OUTSIDE LABS:  CBC:   Lab Results   Component Value Date    WBC 6.8 2024    WBC 7.8 2024    HGB 13.6 2024    HGB 13.7 2024    HCT 40.6 2024    HCT 41.4 2024     2024     2024     BMP:   Lab Results   Component  "Value Date     08/20/2024     12/16/2022    POTASSIUM 4.6 08/20/2024    POTASSIUM 4.3 12/16/2022    CHLORIDE 106 08/20/2024    CHLORIDE 106 12/16/2022    CO2 25 08/20/2024    CO2 21 (L) 12/16/2022    BUN 10.2 08/20/2024    BUN 7.1 12/16/2022    CR 0.94 08/20/2024    CR 0.60 12/16/2022    GLC 87 08/20/2024    GLC 84 12/16/2022     COAGS: No results found for: \"PTT\", \"INR\", \"FIBR\"  POC:   Lab Results   Component Value Date    HCG Negative 09/25/2024     HEPATIC:   Lab Results   Component Value Date    ALBUMIN 4.4 08/20/2024    PROTTOTAL 6.9 08/20/2024    ALT 12 08/20/2024    AST 16 08/20/2024    ALKPHOS 68 08/20/2024    BILITOTAL 0.3 08/20/2024     OTHER:   Lab Results   Component Value Date    BRYSON 9.6 08/20/2024    LIPASE 30 08/20/2024    TSH 1.14 03/08/2024    CRP 0.5 08/25/2022       Anesthesia Plan    ASA Status:  2    NPO Status:  NPO Appropriate    Anesthesia Type: General.     - Airway: ETT      Maintenance: TIVA.        Consents    Anesthesia Plan(s) and associated risks, benefits, and realistic alternatives discussed. Questions answered and patient/representative(s) expressed understanding.     - Discussed: Risks, Benefits and Alternatives for BOTH SEDATION and the PROCEDURE were discussed     - Discussed with:  Patient      - Extended Intubation/Ventilatory Support Discussed: No.      - Patient is DNR/DNI Status: No     Use of blood products discussed: No .     Postoperative Care    Pain management: IV analgesics, Oral pain medications.   PONV prophylaxis: Ondansetron (or other 5HT-3), Dexamethasone or Solumedrol     Comments:             Bill Rausch DO    I have reviewed the pertinent notes and labs in the chart from the past 30 days and (re)examined the patient.  Any updates or changes from those notes are reflected in this note.                  "

## 2024-09-28 NOTE — ANESTHESIA POSTPROCEDURE EVALUATION
Patient: Charlotte Clarke    Procedure: Procedure(s):  ROBOTIC TOTAL LAPAROSCOPIC HYSTERECTOMY, BILATERAL SALPINGECTOMY, CYSTOSCOPY       Anesthesia Type:  General    Note:     Postop Pain Control: Uneventful            Sign Out: Well controlled pain   PONV: No   Neuro/Psych: Uneventful            Sign Out: Acceptable/Baseline neuro status   Airway/Respiratory: Uneventful            Sign Out: Acceptable/Baseline resp. status   CV/Hemodynamics: Uneventful            Sign Out: Acceptable CV status; No obvious hypovolemia; No obvious fluid overload   Other NRE: NONE   DID A NON-ROUTINE EVENT OCCUR? No           Last vitals:  Vitals Value Taken Time   /104 09/27/24 1726   Temp 36.4  C (97.6  F) 09/27/24 1620   Pulse 58 09/27/24 1726   Resp 61 09/27/24 1726   SpO2 100 % 09/27/24 1726   Vitals shown include unfiled device data.    Electronically Signed By: Sharad Gardner MD

## 2024-10-01 LAB
PATH REPORT.COMMENTS IMP SPEC: NORMAL
PATH REPORT.COMMENTS IMP SPEC: NORMAL
PATH REPORT.FINAL DX SPEC: NORMAL
PATH REPORT.GROSS SPEC: NORMAL
PATH REPORT.MICROSCOPIC SPEC OTHER STN: NORMAL
PATH REPORT.RELEVANT HX SPEC: NORMAL
PHOTO IMAGE: NORMAL

## 2024-10-04 NOTE — OP NOTE
Preop DX Menorrhagia  Postop Diagnosis:  Same  Procedure:  Robotic assisted laparoscopic hysterectomy bilateral salpingectomy cystoscopy  Specimens:  uterus cervix Fallopian tubes  Surgeon  Zoya Hernandez  Anesthesia General  EBL 20cc  Drains None  Implants None  Complications None  Findings Normal tubes uterus ovaries, normal cyst dye from both ostia         Procedure:  Patient underwent induction of a general anesthetic, she was carefully prepped and draped for the procedure in sterile fashion. Timeout was performed. Bladder was drained with a Nielsen catheter, uterine manipulator placed into the uterus.      Attention was turned to the abdomen.  An incision was made above the umbilicus.  A Veress needle was introduced with a 2 pop technique, saline drop test confirmed adequate placement. Opening pressure was 3 mmHg.   Insufflation was now done until 15mm of pressure were established.  An 8 nonbladed trocar was placed above the umbilicus. Two robotic assist ports were placed  8 cm lateral to this initial incision.  A right upper quadrant 8 nonbladed trocar was placed. Trandelenburg assistance was used to a 32 degree and the davinci was then brought to the patient s bedside.  The da Scott was then docked.  The PK bipolar forceps were placed at the left da Scott port, the vessel sealer scissors placed in right side of the body.       The procedure began with identifying the anatomy. The uterus appeared  normal and was mobile.  First the right ureter was identified. The right  tube was elevated and we cauterized and cut the mesosalpinx under the tube to the attachment of the uterus.  The tube was left attached to the uterus.  Then the same was performed on the left after finding the ureter there was well.     Next uterine ovarian and the right round ligament were  cauterized and cut on each side.    We then created an anterior and posterior leaf of the broad ligament. This was carried out in similar  fashion on the left side.  The anterior bladder flap was created.  The ureter was then identified again and its course where it dives under the uterine vasculature.  This was repeated on the left side.  At this junction, the uterine vasculature on both sides near the uterocervical isthmus were cauterized and transected.  This cauterization and transection was continued along the cervix until the level of the cardinal ligament.  At this junction anterior colpotomy and posterior colpotomy were performed. The uterus, cervix. and tubes were then delivered through the vagina.  The vaginal cuff was closed with 0 V-lock suture.          Sponge, lap and needle counts were correct x 2.  The cuff was irrigated and found to be hemostatic.  Nielsen removed and cysto inserted  noting normal ureter and bladder anatomy. Strong urine efflux bilaterally was noted from bilateral ureteral orfices.  The trocars were removed and the gas was removed from the abdomen.  Skin was closed with 4-0 Vicryl. Steri-Strips applied.  She was brought to the recovery in stable condition.    YFN Tarango MD

## 2024-11-15 ENCOUNTER — PATIENT OUTREACH (OUTPATIENT)
Dept: CARE COORDINATION | Facility: CLINIC | Age: 34
End: 2024-11-15
Payer: COMMERCIAL

## 2025-01-18 ENCOUNTER — HEALTH MAINTENANCE LETTER (OUTPATIENT)
Age: 35
End: 2025-01-18

## (undated) DEVICE — SOL RINGERS LACTATED 1000ML BAG 2B2324X

## (undated) DEVICE — PROTECTOR ARM STANDARD ONE STEP 40433 (COI)

## (undated) DEVICE — ANTIFOG SOLUTION SEE SHARP 150M TROCAR SWABS 30978 (COI)

## (undated) DEVICE — POSITIONING KIT THE PINK PAD XL 40X20X1IN 40583 (COI)

## (undated) DEVICE — ENDO OBTURATOR ACCESS PORT BLADELESS 8X100MM IAS8-100LP

## (undated) DEVICE — DAVINCI XI DRAPE COLUMN 470341

## (undated) DEVICE — GLOVE BIOGEL PI ULTRATOUCH G SZ 7.0 42170

## (undated) DEVICE — ENDO SHEARS RENEW LAP ENDOCUT SCISSOR TIP 16.5MM 3142

## (undated) DEVICE — CUSTOM PACK DA VINCI GYN SMA5BDVHEA

## (undated) DEVICE — TUBING IRRIG TUR Y TYPE 96" LF 6543-01

## (undated) DEVICE — GLOVE PI ULTRATCH M LF SZ 6.5 PF CUFF TEXT STRL LF 42665

## (undated) DEVICE — DAVINCI XI OBTURATOR BLADELESS 8MM 470359

## (undated) DEVICE — SOL WATER IRRIG 1000ML BOTTLE 2F7114

## (undated) DEVICE — NEEDLE HYPO MAGELLAN SAFETY 22GA 1 1/2IN 8881850215

## (undated) DEVICE — GLOVE SURG PI ULTRA TOUCH M SZ 7 LF 42670

## (undated) DEVICE — DECANTER VIAL 2006S

## (undated) DEVICE — DAVINCI XI SEAL UNIVERSAL 5-12MM 470500

## (undated) DEVICE — Device

## (undated) DEVICE — PACK MINOR SINGLE BASIN SSK3001

## (undated) DEVICE — PREP CHLORAPREP 26ML TINTED HI-LITE ORANGE 930815

## (undated) DEVICE — PREP DYNA-HEX 4% CHG SCRUB 4OZ BOTTLE MDS098710

## (undated) DEVICE — SU WND CLOSURE VLOC 180 ABS 0 9" GS-21 VLOCL0346

## (undated) DEVICE — LUBRICANT INST ELECTROLUBE EL101

## (undated) DEVICE — BRIEF STRETCH XL MPS40

## (undated) DEVICE — GOWN XXL 9575

## (undated) DEVICE — DAVINCI XI DRAPE ARM 470015

## (undated) DEVICE — NDL INSUFFLATION 13GA 120MM C2201

## (undated) DEVICE — BLADE KNIFE SURG 11 371111

## (undated) DEVICE — TUBING FILTER TRI-LUMEN AIRSEAL ASC-EVAC1

## (undated) DEVICE — CATH FOLEY 16FR 5ML LUBRICATH LATEX 0165L16

## (undated) DEVICE — SU VICRYL+ 4-0 UNDYED PS-2 VCP496ZH

## (undated) DEVICE — ELECTRODE PATIENT RETURN ADULT L10 FT 2 PLATE CORD 0855C

## (undated) DEVICE — SYR 50ML SLIP TIP W/O NDL 309654

## (undated) DEVICE — DAVINCI HOT SHEARS TIP COVER  400180

## (undated) DEVICE — SUCTION STRYKERFLOW II 250-070-500

## (undated) DEVICE — SU DERMABOND ADVANCED .7ML DNX12

## (undated) DEVICE — SOLUTION IV 2B0304X STRL WATER 1000ML

## (undated) RX ORDER — FENTANYL CITRATE 50 UG/ML
INJECTION, SOLUTION INTRAMUSCULAR; INTRAVENOUS
Status: DISPENSED
Start: 2024-09-27

## (undated) RX ORDER — PROPOFOL 10 MG/ML
INJECTION, EMULSION INTRAVENOUS
Status: DISPENSED
Start: 2024-09-27

## (undated) RX ORDER — KETOROLAC TROMETHAMINE 30 MG/ML
INJECTION, SOLUTION INTRAMUSCULAR; INTRAVENOUS
Status: DISPENSED
Start: 2024-09-27